# Patient Record
Sex: FEMALE | Race: WHITE | Employment: FULL TIME | URBAN - METROPOLITAN AREA
[De-identification: names, ages, dates, MRNs, and addresses within clinical notes are randomized per-mention and may not be internally consistent; named-entity substitution may affect disease eponyms.]

---

## 2018-12-11 ENCOUNTER — OFFICE VISIT (OUTPATIENT)
Dept: FAMILY MEDICINE CLINIC | Age: 43
End: 2018-12-11

## 2018-12-11 VITALS
DIASTOLIC BLOOD PRESSURE: 87 MMHG | TEMPERATURE: 97.7 F | RESPIRATION RATE: 18 BRPM | HEART RATE: 82 BPM | OXYGEN SATURATION: 97 % | HEIGHT: 62 IN | SYSTOLIC BLOOD PRESSURE: 117 MMHG | WEIGHT: 209 LBS | BODY MASS INDEX: 38.46 KG/M2

## 2018-12-11 DIAGNOSIS — K59.00 CONSTIPATION, UNSPECIFIED CONSTIPATION TYPE: ICD-10-CM

## 2018-12-11 DIAGNOSIS — R73.03 PREDIABETES: Primary | ICD-10-CM

## 2018-12-11 DIAGNOSIS — E55.9 VITAMIN D DEFICIENCY: ICD-10-CM

## 2018-12-11 DIAGNOSIS — M25.551 PAIN OF RIGHT HIP JOINT: ICD-10-CM

## 2018-12-11 DIAGNOSIS — Z87.74 S/P PATENT FORAMEN OVALE CLOSURE: ICD-10-CM

## 2018-12-11 DIAGNOSIS — R23.2 HOT FLASHES: ICD-10-CM

## 2018-12-11 DIAGNOSIS — M51.26 HERNIATED LUMBAR DISC WITHOUT MYELOPATHY: ICD-10-CM

## 2018-12-11 DIAGNOSIS — R91.1 PULMONARY NODULE: ICD-10-CM

## 2018-12-11 DIAGNOSIS — M25.50 ARTHRALGIA, UNSPECIFIED JOINT: ICD-10-CM

## 2018-12-11 DIAGNOSIS — M25.522 LEFT ELBOW PAIN: ICD-10-CM

## 2018-12-12 LAB
ABSOLUTE LYMPHOCYTE COUNT, 10803: 1.6 K/UL (ref 1–4.8)
ANA SCREEN, IFA: NEGATIVE
AVG GLU, 10930: 112 MG/DL (ref 91–123)
B BURGDOR IGG+IGM SER-ACNC: <0.9 INDEX
BASOPHILS # BLD: 0 K/UL (ref 0–0.2)
BASOPHILS NFR BLD: 1 % (ref 0–2)
C-REACTIVE PROTEIN, QT, 006627: 0.4 MG/DL (ref 0–0.5)
CCP ANTIBODY IGG,99138601: 0.7 U/ML
EOSINOPHIL # BLD: 0.2 K/UL (ref 0–0.5)
EOSINOPHIL NFR BLD: 3 % (ref 0–6)
ERYTHROCYTE [DISTWIDTH] IN BLOOD BY AUTOMATED COUNT: 13 % (ref 10–15.5)
FSH SERPL-ACNC: 9.1 MIU/ML
GRANULOCYTES,GRANS: 56 % (ref 40–75)
HBA1C MFR BLD HPLC: 5.5 % (ref 4.8–5.9)
HCT VFR BLD AUTO: 41 % (ref 35.1–46.5)
HGB BLD-MCNC: 12.7 G/DL (ref 11.7–15.5)
LH SERPL-ACNC: 20.1 MIU/ML
LYMPHOCYTES, LYMLT: 31 % (ref 20–45)
MCH RBC QN AUTO: 29 PG (ref 26–34)
MCHC RBC AUTO-ENTMCNC: 31 G/DL (ref 31–36)
MCV RBC AUTO: 94 FL (ref 80–95)
MONOCYTES # BLD: 0.4 K/UL (ref 0.1–1)
MONOCYTES NFR BLD: 8 % (ref 3–12)
NEUTROPHILS # BLD AUTO: 2.9 K/UL (ref 1.8–7.7)
PLATELET # BLD AUTO: 262 K/UL (ref 140–440)
PMV BLD AUTO: 12.3 FL (ref 9–13)
RBC # BLD AUTO: 4.35 M/UL (ref 3.8–5.2)
SED RATE (ESR): 20 MM/HR (ref 0–20)
TSH SERPL DL<=0.005 MIU/L-ACNC: 2.27 MCU/ML (ref 0.27–4.2)
WBC # BLD AUTO: 5.1 K/UL (ref 4–11)

## 2018-12-13 LAB — CALCITRIOL, 081092: 51.7 PG/ML

## 2018-12-27 ENCOUNTER — TELEPHONE (OUTPATIENT)
Dept: FAMILY MEDICINE CLINIC | Age: 43
End: 2018-12-27

## 2018-12-27 NOTE — TELEPHONE ENCOUNTER
Patient called in wanting to speak with a nurse or the doctor in regards to her blood work results.  Please assist.

## 2018-12-27 NOTE — TELEPHONE ENCOUNTER
Patient transferred to nurse, advised patient that Dr. Shania Gomez will call when results are abnormal. At this time, results are normal. Nurse advised patient that provider will be able to further explain if she is in going through menopause during her next visit, scheduled 01/11/2019. Per verbalized understanding, this encounter will be closed.

## 2019-01-11 ENCOUNTER — OFFICE VISIT (OUTPATIENT)
Dept: FAMILY MEDICINE CLINIC | Age: 44
End: 2019-01-11

## 2019-01-11 VITALS
OXYGEN SATURATION: 97 % | RESPIRATION RATE: 16 BRPM | BODY MASS INDEX: 38.28 KG/M2 | WEIGHT: 208 LBS | DIASTOLIC BLOOD PRESSURE: 85 MMHG | HEIGHT: 62 IN | TEMPERATURE: 98.2 F | SYSTOLIC BLOOD PRESSURE: 127 MMHG | HEART RATE: 69 BPM

## 2019-01-11 DIAGNOSIS — R91.1 LUNG NODULE: Primary | ICD-10-CM

## 2019-01-11 DIAGNOSIS — R91.1 LUNG NODULE: ICD-10-CM

## 2019-01-11 NOTE — PROGRESS NOTES
1. Have you been to the ER, urgent care clinic since your last visit? Hospitalized since your last visit? No    2. Have you seen or consulted any other health care providers outside of the [address] [place] [address] since your last visit? Include any pap smears or colon screening.  No

## 2019-01-13 NOTE — PROGRESS NOTES
Soila Meredith is a 41 y.o.  female and presents with     Chief Complaint   Patient presents with    Joint Pain    Anxiety    Fatigue    Constipation      Pt is here for lab results. Pt does have constipation and has appt with GI. She wonders if she is going through menopause. She does get night sweats. No past medical history on file. No past surgical history on file. No current outpatient medications on file. No current facility-administered medications for this visit. Health Maintenance   Topic Date Due    DTaP/Tdap/Td series (1 - Tdap) 03/18/1998    PAP AKA CERVICAL CYTOLOGY  03/18/1998    Influenza Age 5 to Adult  08/01/2018       There is no immunization history on file for this patient. No LMP recorded. Allergies and Intolerances: Allergies   Allergen Reactions    Morphine Anaphylaxis       Family History:   No family history on file. Social History:   She  reports that  has never smoked. she has never used smokeless tobacco.  She  reports that she does not drink alcohol.             Review of Systems:   General: negative for - chills, fatigue, fever, weight change  Psych: negative for - anxiety, depression, irritability or mood swings  ENT: negative for - headaches, hearing change, nasal congestion, oral lesions, sneezing or sore throat  Heme/ Lymph: negative for - bleeding problems, bruising, pallor or swollen lymph nodes  Endo: negative for - hot flashes, polydipsia/polyuria or temperature intolerance  Resp: negative for - cough, shortness of breath or wheezing  CV: negative for - chest pain, edema or palpitations  GI: negative for - abdominal pain, change in bowel habits, constipation, diarrhea or nausea/vomiting  : negative for - dysuria, hematuria, incontinence, pelvic pain or vulvar/vaginal symptoms  MSK: negative for - joint pain, joint swelling or muscle pain  Neuro: negative for - confusion, headaches, seizures or weakness  Derm: negative for - dry skin, hair changes, rash or skin lesion changes          Physical:   Vitals:   Vitals:    01/11/19 0929   BP: 127/85   Pulse: 69   Resp: 16   Temp: 98.2 °F (36.8 °C)   TempSrc: Oral   SpO2: 97%   Weight: 208 lb (94.3 kg)   Height: 5' 2\" (1.575 m)           Exam:   HEENT- atraumatic,normocephalic, awake, oriented, well nourished  Neck - supple,no enlarged lymph nodes, no JVD, no thyromegaly  Chest- CTA, no rhonchi, no crackles  Heart- rrr, no murmurs / gallop/rub  Abdomen- soft,BS+,NT, no hepatosplenomegaly  Ext - no c/c/edema   Neuro- no focal deficits. Power 5/5 all extremities  Skin - warm,dry, no obvious rashes. Review of Data:   LABS:   Lab Results   Component Value Date/Time    WBC 5.1 12/11/2018 11:04 AM    HGB 12.7 12/11/2018 11:04 AM    HCT 41.0 12/11/2018 11:04 AM    PLATELET 189 29/56/5233 11:04 AM     No results found for: NA, K, CL, CO2, GLU, BUN, CREA  No results found for: CHOL, CHOLX, CHLST, CHOLV, HDL, LDL, LDLC, DLDLP, TGLX, TRIGL, TRIGP  No results found for: GPT        Impression / Plan:        ICD-10-CM ICD-9-CM    1. Lung nodule R91.1 793.11 CT CHEST W WO CONT      ANGIOTENSIN CONVERTING ENZYME      REFERRAL TO PULMONARY DISEASE     Night , sweats ,fatigue, pulmonary nodule - will order CT chest.  Labs reviewed with pt. Explained to patient risk benefits of the medications. Advised patient to stop meds if having any side effects. Pt verbalized understanding of the instructions. I have discussed the diagnosis with the patient and the intended plan as seen in the above orders. The patient has received an after-visit summary and questions were answered concerning future plans. I have discussed medication side effects and warnings with the patient as well. I have reviewed the plan of care with the patient, accepted their input and they are in agreement with the treatment goals. Reviewed plan of care. Patient has provided input and agrees with goals.     Follow-up Disposition:  Return in about 5 weeks (around 2/15/2019).     Oli Luna MD

## 2019-01-17 LAB — ACE,ACE: 56 U/L

## 2019-01-23 ENCOUNTER — HOSPITAL ENCOUNTER (OUTPATIENT)
Dept: CT IMAGING | Age: 44
Discharge: HOME | End: 2019-01-23
Attending: INTERNAL MEDICINE
Payer: MEDICAID

## 2019-01-23 ENCOUNTER — TELEPHONE (OUTPATIENT)
Dept: FAMILY MEDICINE CLINIC | Age: 44
End: 2019-01-23

## 2019-01-23 DIAGNOSIS — R91.1 LUNG NODULE: ICD-10-CM

## 2019-01-23 PROCEDURE — 74011636320 HC RX REV CODE- 636/320: Performed by: INTERNAL MEDICINE

## 2019-01-23 PROCEDURE — 71260 CT THORAX DX C+: CPT

## 2019-01-23 RX ADMIN — IOPAMIDOL 45 ML: 612 INJECTION, SOLUTION INTRAVENOUS at 12:52

## 2019-01-23 NOTE — TELEPHONE ENCOUNTER
Patient called in reference to latest lab results. When patient was told that I could put in a message for the nurse to give her a call back. Patient states that she has a problem getting phone calls back and that she hopes that someone will call her back today.  Please assist.

## 2019-01-23 NOTE — TELEPHONE ENCOUNTER
Nurse called patient, two patient identifiers used and confirmed by patient. Advised patient that lab is WNL and if labs are within normal range, Dr. Clarisa Young will not call, if a lab is out of range, she will receive a call along with plan of care. Pt downloaded [employer] as well, pt stated she looked at the lab was not sure if it was normal or abnormal. Pt is also scheduled for CT chest today 01/23/2019. Pt verbalized understanding.

## 2019-01-28 ENCOUNTER — OFFICE VISIT (OUTPATIENT)
Dept: PULMONOLOGY | Facility: CLINIC | Age: 44
End: 2019-01-28

## 2019-01-28 VITALS
TEMPERATURE: 98.6 F | SYSTOLIC BLOOD PRESSURE: 110 MMHG | HEART RATE: 81 BPM | OXYGEN SATURATION: 97 % | WEIGHT: 202 LBS | RESPIRATION RATE: 16 BRPM | HEIGHT: 62 IN | BODY MASS INDEX: 37.17 KG/M2 | DIASTOLIC BLOOD PRESSURE: 80 MMHG

## 2019-01-28 DIAGNOSIS — R91.1 LUNG NODULE: Primary | ICD-10-CM

## 2019-01-28 RX ORDER — DULOXETIN HYDROCHLORIDE 20 MG/1
20 CAPSULE, DELAYED RELEASE ORAL DAILY
COMMUNITY
End: 2021-06-29
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
## 2019-02-14 ENCOUNTER — OFFICE VISIT (OUTPATIENT)
Dept: FAMILY MEDICINE CLINIC | Age: 44
End: 2019-02-14

## 2019-02-14 VITALS
DIASTOLIC BLOOD PRESSURE: 80 MMHG | HEART RATE: 97 BPM | TEMPERATURE: 98.4 F | HEIGHT: 62 IN | BODY MASS INDEX: 38.83 KG/M2 | SYSTOLIC BLOOD PRESSURE: 120 MMHG | WEIGHT: 211 LBS | OXYGEN SATURATION: 98 % | RESPIRATION RATE: 16 BRPM

## 2019-02-14 DIAGNOSIS — Z12.39 BREAST CANCER SCREENING: Primary | ICD-10-CM

## 2019-02-14 NOTE — PROGRESS NOTES
1. Have you been to the ER, urgent care clinic since your last visit? Hospitalized since your last visit? No    2. Have you seen or consulted any other health care providers outside of the [address] [place] [address] since your last visit? Include any pap smears or colon screening.  No

## 2019-02-15 NOTE — PROGRESS NOTES
Bennie Kahn is a 41 y.o.  female and presents with     Chief Complaint   Patient presents with    Anxiety    Constipation    Fatigue    Sexual Problem       Pt saw GI for constipation. Pt was not too happy with the GI physician. Pt saw Pulmonary for lung nodule. She has follow up CT scan scheduled in 1 year. Pt feels fatigued and thinks since she turned 42 some hormones are switched off and has loss of libido. No past medical history on file. No past surgical history on file. Current Outpatient Medications   Medication Sig    DULoxetine (CYMBALTA) 20 mg capsule Take 20 mg by mouth daily. No current facility-administered medications for this visit. Health Maintenance   Topic Date Due    DTaP/Tdap/Td series (1 - Tdap) 03/18/1998    PAP AKA CERVICAL CYTOLOGY  03/18/1998    Influenza Age 5 to Adult  08/01/2018       There is no immunization history on file for this patient. Patient's last menstrual period was 02/07/2019. Allergies and Intolerances: Allergies   Allergen Reactions    Morphine Anaphylaxis       Family History:   No family history on file. Social History:   She  reports that  has never smoked. she has never used smokeless tobacco.  She  reports that she does not drink alcohol.             Review of Systems:   General: negative for - chills, fatigue, fever, weight change  Psych: negative for - anxiety, depression, irritability or mood swings  ENT: negative for - headaches, hearing change, nasal congestion, oral lesions, sneezing or sore throat  Heme/ Lymph: negative for - bleeding problems, bruising, pallor or swollen lymph nodes  Endo: negative for - hot flashes, polydipsia/polyuria or temperature intolerance  Resp: negative for - cough, shortness of breath or wheezing  CV: negative for - chest pain, edema or palpitations  GI: negative for - abdominal pain, change in bowel habits, constipation, diarrhea or nausea/vomiting  : negative for - dysuria, hematuria, incontinence, pelvic pain or vulvar/vaginal symptoms  MSK: negative for - joint pain, joint swelling or muscle pain  Neuro: negative for - confusion, headaches, seizures or weakness  Derm: negative for - dry skin, hair changes, rash or skin lesion changes          Physical:   Vitals:   Vitals:    02/14/19 1317   BP: 120/80   Pulse: 97   Resp: 16   Temp: 98.4 °F (36.9 °C)   TempSrc: Oral   SpO2: 98%   Weight: 211 lb (95.7 kg)   Height: 5' 2\" (1.575 m)           Exam:   HEENT- atraumatic,normocephalic, awake, oriented, well nourished  Neck - supple,no enlarged lymph nodes, no JVD, no thyromegaly  Chest- CTA, no rhonchi, no crackles  Heart- rrr, no murmurs / gallop/rub  Abdomen- soft,BS+,NT, no hepatosplenomegaly  Ext - no c/c/edema   Neuro- no focal deficits. Power 5/5 all extremities  Skin - warm,dry, no obvious rashes. Review of Data:   LABS:   Lab Results   Component Value Date/Time    WBC 5.1 12/11/2018 11:04 AM    HGB 12.7 12/11/2018 11:04 AM    HCT 41.0 12/11/2018 11:04 AM    PLATELET 073 32/87/2124 11:04 AM     No results found for: NA, K, CL, CO2, GLU, BUN, CREA  No results found for: CHOL, CHOLX, CHLST, CHOLV, HDL, LDL, LDLC, DLDLP, TGLX, TRIGL, TRIGP  No results found for: GPT        Impression / Plan:        ICD-10-CM ICD-9-CM    1. Breast cancer screening Z12.31 V76.10 [employer] MAMMO BI SCREENING INCL CAD     Informed pt that there is no obvious cause medicaly that can explain her fatgue ,joint pains and loss of libido. Work up has been neg, Positive mind set would help alleviated symptoms. Explained to patient risk benefits of the medications. Advised patient to stop meds if having any side effects. Pt verbalized understanding of the instructions. I have discussed the diagnosis with the patient and the intended plan as seen in the above orders. The patient has received an after-visit summary and questions were answered concerning future plans.   I have discussed medication side effects and warnings with the patient as well. I have reviewed the plan of care with the patient, accepted their input and they are in agreement with the treatment goals. Reviewed plan of care. Patient has provided input and agrees with goals. Follow-up Disposition:  Return in about 3 months (around 5/14/2019).     Frederick Leone MD

## 2019-04-17 ENCOUNTER — HOSPITAL ENCOUNTER (OUTPATIENT)
Dept: MAMMOGRAPHY | Age: 44
Discharge: HOME | End: 2019-04-17
Attending: INTERNAL MEDICINE
Payer: MEDICAID

## 2019-04-17 DIAGNOSIS — Z12.39 BREAST CANCER SCREENING: ICD-10-CM

## 2019-04-17 PROCEDURE — 77063 BREAST TOMOSYNTHESIS BI: CPT

## 2020-01-09 ENCOUNTER — OFFICE VISIT (OUTPATIENT)
Dept: FAMILY MEDICINE CLINIC | Age: 45
End: 2020-01-09

## 2020-01-09 VITALS
BODY MASS INDEX: 40.3 KG/M2 | WEIGHT: 219 LBS | HEART RATE: 84 BPM | SYSTOLIC BLOOD PRESSURE: 134 MMHG | TEMPERATURE: 96.4 F | HEIGHT: 62 IN | DIASTOLIC BLOOD PRESSURE: 86 MMHG | RESPIRATION RATE: 18 BRPM | OXYGEN SATURATION: 98 %

## 2020-01-09 DIAGNOSIS — F43.10 PTSD (POST-TRAUMATIC STRESS DISORDER): ICD-10-CM

## 2020-01-09 DIAGNOSIS — E55.9 VITAMIN D DEFICIENCY: Primary | ICD-10-CM

## 2020-01-09 DIAGNOSIS — R73.03 PREDIABETES: ICD-10-CM

## 2020-01-09 DIAGNOSIS — R03.0 ELEVATED BLOOD PRESSURE READING: ICD-10-CM

## 2020-01-09 DIAGNOSIS — R42 EPISODIC LIGHTHEADEDNESS: ICD-10-CM

## 2020-01-09 DIAGNOSIS — R91.1 LUNG NODULE: ICD-10-CM

## 2020-01-09 PROBLEM — K59.01 SLOW TRANSIT CONSTIPATION: Status: ACTIVE | Noted: 2020-01-09

## 2020-01-09 RX ORDER — GABAPENTIN 100 MG/1
CAPSULE ORAL 3 TIMES DAILY
COMMUNITY
End: 2021-08-10

## 2020-01-09 RX ORDER — INSULIN PUMP SYRINGE, 3 ML
EACH MISCELLANEOUS
Qty: 1 KIT | Refills: 0 | Status: SHIPPED | OUTPATIENT
Start: 2020-01-09 | End: 2021-06-29

## 2020-01-09 RX ORDER — LANCETS
EACH MISCELLANEOUS
Qty: 100 EACH | Refills: 0 | Status: SHIPPED | OUTPATIENT
Start: 2020-01-09 | End: 2021-06-29

## 2020-01-09 RX ORDER — ACETAMINOPHEN 500 MG
TABLET ORAL
Qty: 1 KIT | Refills: 0 | Status: SHIPPED | OUTPATIENT
Start: 2020-01-09 | End: 2021-06-29

## 2020-01-09 RX ORDER — LUBIPROSTONE 8 UG/1
24 CAPSULE, GELATIN COATED ORAL 2 TIMES DAILY WITH MEALS
COMMUNITY
Start: 2019-12-10 | End: 2020-03-23 | Stop reason: DRUGHIGH

## 2020-01-09 RX ORDER — GABAPENTIN 100 MG/1
CAPSULE ORAL
COMMUNITY
Start: 2019-12-07 | End: 2020-01-09 | Stop reason: SDUPTHER

## 2020-01-09 RX ORDER — LUBIPROSTONE 24 UG/1
CAPSULE, GELATIN COATED ORAL
COMMUNITY
Start: 2020-01-03 | End: 2020-01-09 | Stop reason: SDUPTHER

## 2020-01-09 NOTE — PROGRESS NOTES
History of Present Illness  Michael Gibbs is a 45 y.o. female who presents today for management of    Chief Complaint   Patient presents with    Dizziness    Blood Pressure Check     neurologist told patient to talk to her PCP about bp as well as blood sugar       Patient is here to establish care. Previous PCP: Dr. Louis Davis    Patient complains of intermittent dizziness. Onset was few years ago. Episodes occurs randomly. Episodes last 5-10 minutes. Only occurs when standing or in upright position. It is associated with nausea. She was prescribed gabapentin by her neurologist for anxiety. She denies any headaches. Patient reports of having a positive tilt table test in 15 years ago. Past Medical History  Past Medical History:   Diagnosis Date    Anxiety     Lung nodule     Prediabetes         Surgical History  Past Surgical History:   Procedure Laterality Date    HX OTHER SURGICAL      Closure of patent foramen ovale        Current Medications  Current Outpatient Medications   Medication Sig    gabapentin (NEURONTIN) 100 mg capsule Take  by mouth three (3) times daily.  Blood-Glucose Meter monitoring kit Use as needed    Blood Pressure Monitor (BLOOD PRESSURE KIT) kit Use as needed    glucose blood VI test strips (BLOOD GLUCOSE TEST) strip Use once daily as needed    lancets misc Use once daily as needed    AMITIZA 8 mcg capsule     DULoxetine (CYMBALTA) 20 mg capsule Take 20 mg by mouth daily. No current facility-administered medications for this visit. Allergies/Drug Reactions  Allergies   Allergen Reactions    Morphine Anaphylaxis        Family History  History reviewed. No pertinent family history.      Social History  Social History     Socioeconomic History    Marital status: LEGALLY      Spouse name: Not on file    Number of children: Not on file    Years of education: Not on file    Highest education level: Not on file   Occupational History    Not on file Social Needs    Financial resource strain: Not on file    Food insecurity:     Worry: Not on file     Inability: Not on file    Transportation needs:     Medical: Not on file     Non-medical: Not on file   Tobacco Use    Smoking status: Never Smoker    Smokeless tobacco: Never Used   Substance and Sexual Activity    Alcohol use: No     Frequency: Never    Drug use: No    Sexual activity: Not Currently   Lifestyle    Physical activity:     Days per week: Not on file     Minutes per session: Not on file    Stress: Not on file   Relationships    Social connections:     Talks on phone: Not on file     Gets together: Not on file     Attends [employer] service: Not on file     Active member of club or organization: Not on file     Attends meetings of clubs or organizations: Not on file     Relationship status: Not on file    Intimate partner violence:     Fear of current or ex partner: Not on file     Emotionally abused: Not on file     Physically abused: Not on file     Forced sexual activity: Not on file   Other Topics Concern    Not on file   Social History Narrative    Not on file       Health Maintenance   Topic Date Due    DTaP/Tdap/Td series (1 - Tdap) 03/18/1988    PAP AKA CERVICAL CYTOLOGY  03/18/1998    Influenza Age 5 to Adult  08/01/2019    Pneumococcal 0-64 years  Aged Out       There is no immunization history on file for this patient. Review of Systems  Review of Systems   Constitutional: Negative. HENT: Negative. Respiratory: Negative. Cardiovascular: Positive for palpitations. Gastrointestinal: Positive for constipation. Genitourinary: Negative. Musculoskeletal: Negative. Skin: Negative. Neurological: Positive for dizziness. Psychiatric/Behavioral: The patient is nervous/anxious.          Physical Exam  Vital signs:   Vitals:    01/09/20 1432 01/09/20 1442   BP: (!) 134/91 134/86   Pulse: 84    Resp: 18    Temp: 96.4 °F (35.8 °C)    SpO2: 98%    Weight: 219 lb (99.3 kg)    Height: 5' 2\" (1.575 m)        General: alert, oriented, not in distress  Head: scalp normal, atraumatic  Eyes: pupils are equal and reactive, full and intact EOM's  Ears: patent ear canal, intact tympanic membrane  Nose: normal turbinates, no congestion or discharge  Lips/Mouth: moist lips and buccal mucosa, non-enlarged tonsils, pink throat  Neck: supple, no JVD, no lymphadenopathy, non-palpable thyroid  Chest/Lungs: clear breath sounds, no wheezing or crackles  Heart: normal rate, regular rhythm, no murmur  Extremities: no focal deformities, no edema  Skin: no active skin lesions      Laboratory/Tests:  CBC WITH DIFFERENTIAL AUTO (01/02/2020 5:36 PM EST)  CBC WITH DIFFERENTIAL AUTO (01/02/2020 5:36 PM EST)   Component Value Ref Range Performed At Pathologist Signature   WBC x 10*3 7.1 4.0 - 11.0 K/uL [facility]     RBC x 10^6 4. 15 3.80 - 5.20 M/uL [facility]     HGB 12.3 11.7 - 15.5 g/dL [facility]     HCT 36.4 35.1 - 46.5 % [facility]     MCV 88 81 - 99 fL [facility]     MCH 30 26 - 34 [facility]     MCHC 34 31 - 36 g/dL [facility]     RDW 12.0 10.0 - 15.5 % [facility]     Platelet 770 126 - 711 K/uL [facility]     MPV 11.4 9.0 - 13.0 fL [facility]     Segmented Neutrophils 61 40 - 75 % [facility]     Lymphocytes 26 20 - 45 % [facility]     Monocytes 11 3 - 12 % [facility]     Eosinophil 1 0 - 6 % [facility]     Basophils 1 0 - 2 % [facility]     Absolute Neutrophils 4.3 1.8 - 7.7 K/uL [facility]     Absolute Lymphocytes 1.8 1.0 - 4.8 K/uL [facility]     Absolute Monocyte Count 0.8 0.1 - 1.0 K/uL [facility]     Absolute Eosinophil 0.1 0.0 - 0.5 K/uL [facility]     Absolute Basophil Count 0.1 0.0 - 0.2 K/uL [facility]     Vitamin B12 and Folate (01/02/2020 5:36 PM EST)  Vitamin B12 and Folate (01/02/2020 5:36 PM EST)   Component Value Ref Range Performed At Pathologist [facility]   Vitamin B12 371 211 - 911 pg/mL [facility]     Folate 11.96 >=3.10 ng/mL [facility]       Vitamin D 25 Hydroxy (01/02/2020 5:36 PM EST)  Vitamin D 25 Hydroxy (01/02/2020 5:36 PM EST)   Component Value Ref Range Performed At Pathologist [facility]   Vitamin D, 25 Hydroxy 21.7 (L) 32.0 - 100.0 ng/mL [facility]       Basic Metabolic Panel (92/11/1410 5:36 PM EST)  Basic Metabolic Panel (59/49/2869 5:36 PM EST)   Component Value Ref Range Performed At Pathologist Signature   Potassium 3.8 3.5 - 5.5 mmol/L [facility]     Sodium 139 133 - 145 mmol/L [facility]     Chloride 100 98 - 110 mmol/L [facility]     Glucose 117 (H) 70 - 99 mg/dL [facility]     Calcium 9.0 8.4 - 10.5 mg/dL [facility]     BUN 16 6 - 22 mg/dL [facility]     Creatinine 0.8 0.5 - 1.2 mg/dL [facility]     CO2 27 20 - 32 mmol/L [facility]     eGFR  >60.0 >60.0 [facility]     eGFR Non African American >60.0 >60.0 [facility]     Anion Gap 12.0 mmol/L [facility]       T4 Free (01/02/2020 5:36 PM EST)  T4 Free (01/02/2020 5:36 PM EST)   Component Value Ref Range Performed At Pathologist [facility]   T4 FREE 1.1 0.9 - 1.8 ng/dL [facility]       T4 Free (01/02/2020 5:36 PM EST)   Specimen   Blood - Blood (substance)     T4 Free (01/02/2020 5:36 PM EST)   Performing Organization Address City/State/Zipcode Phone Number   [address]  [address] [place] [address], [address] [place] [address], [address] [place] [address] [phone]     Back to top of Lab Results       TSH (01/02/2020 5:36 PM EST)  TSH (01/02/2020 5:36 PM EST)   Component Value Ref Range Performed At Pathologist [facility]   TSH 1.32 0.27 - 4.20 mcU/mL [facility]         Component      Latest Ref Rng & Units 12/11/2018 12/11/2018 12/11/2018          11:04 AM 11:04 AM 11:04 AM   WBC      4.0 - 11.0 K/uL      RBC      3.80 - 5.20 M/uL      HGB      11.7 - 15.5 g/dL      HCT      35.1 - 46.5 %      MCV      80 - 95 fL      MCH      26 - 34 pg      MCHC      31 - 36 g/dL      RDW      10.0 - 15.5 %      PLATELET      891 - 996 K/uL      MPV      9.0 - 13.0 fL      NEUTROPHILS      40 - 75 %      Lymphocytes      20 - 45 %      MONOCYTES      3 - 12 %      EOSINOPHILS      0 - 6 %      BASOPHILS      0 - 2 %      ABS. NEUTROPHILS      1.8 - 7.7 K/uL      ABSOLUTE LYMPHOCYTE COUNT      1.0 - 4.8 K/uL      ABS. MONOCYTES      0.1 - 1.0 K/uL      ABS. EOSINOPHILS      0.0 - 0.5 K/uL      ABS. BASOPHILS      0.0 - 0.2 K/uL      Hemoglobin A1c, (calculated)      4.8 - 5.9 %  5.5    AVG GLU      91 - 123 mg/dL  112    Calcitriol (Vit D 1, 25 di-OH)      19.9 - 79 pg/mL 51.7     TSH      0.27 - 4.20 mcU/mL   2.27     Component      Latest Ref Rng & Units 12/11/2018          11:04 AM   WBC      4.0 - 11.0 K/uL 5.1   RBC      3.80 - 5.20 M/uL 4.35   HGB      11.7 - 15.5 g/dL 12.7   HCT      35.1 - 46.5 % 41.0   MCV      80 - 95 fL 94   MCH      26 - 34 pg 29   MCHC      31 - 36 g/dL 31   RDW      10.0 - 15.5 % 13.0   PLATELET      177 - 550 K/uL 262   MPV      9.0 - 13.0 fL 12.3   NEUTROPHILS      40 - 75 % 56   Lymphocytes      20 - 45 % 31   MONOCYTES      3 - 12 % 8   EOSINOPHILS      0 - 6 % 3   BASOPHILS      0 - 2 % 1   ABS. NEUTROPHILS      1.8 - 7.7 K/uL 2.9   ABSOLUTE LYMPHOCYTE COUNT      1.0 - 4.8 K/uL 1.6   ABS. MONOCYTES      0.1 - 1.0 K/uL 0.4   ABS. EOSINOPHILS      0.0 - 0.5 K/uL 0.2   ABS.  BASOPHILS      0.0 - 0.2 K/uL 0.0   Hemoglobin A1c, (calculated)      4.8 - 5.9 %    AVG GLU      91 - 123 mg/dL    Calcitriol (Vit D 1, 25 di-OH)      19.9 - 79 pg/mL    TSH      0.27 - 4.20 mcU/mL      Component      Latest Ref Rng & Units 12/11/2018 12/11/2018          11:04 AM 11:04 AM   NISA SCREEN, IFA      Negative  Negative   CCP Ab, IgG      <=2.9 U/mL 0.7      CT CHEST 01/23/2019  COMPARISON: None.     INDICATION: History of lung nodules.     TECHNIQUE: Axial was performed through the chest with contrast.  Coronal and  sagittal reformations were generated.      One or more dose reduction techniques were used on this CT: automated exposure  control, adjustment of the mAs and/or kVp according to patient's size, and  iterative reconstruction techniques. The specific techniques utilized on this CT  exam have been documented in the patient's electronic medical record.     ============     LUNGS: 5 mm solid intrafissural pulmonary nodule at the minor fissure (series 3,  image 42). 3 mm posterior right upper lobe subpleural solid nodule (series 3,  image 29). No other suspicious pulmonary nodules or masses. No acute infiltrate.     PLEURA: Normal, with no effusion or pneumothorax.     AIRWAY: Unremarkable.     MEDIASTINUM: Normal heart size. No pericardial effusion. Great vessels are  unremarkable. Possibly atrial closure device noted in the interatrial septum.     LYMPH NODES: No mediastinal, hilar or axillary lymphadenopathy.     UPPER ABDOMEN: Cholelithiasis without acute -cholecystitis.     OTHER: No acute osseous abnormality. 7 mm sclerotic focus in the right T5  vertebral body.     ============     IMPRESSION  IMPRESSION:     No suspicious pulmonary nodules or masses. Right interfissural and subpleural nodules likely lymph nodes. No further follow-up is required.       Assessment/Plan:      1. Episodic lightheadedness  - ? POTS  - advised to liberalize salt intake  - start home blood pressure and blood sugar monitoring  - Blood-Glucose Meter monitoring kit; Use as needed  Dispense: 1 Kit; Refill: 0  - Blood Pressure Monitor (BLOOD PRESSURE KIT) kit; Use as needed  Dispense: 1 Kit; Refill: 0  - glucose blood VI test strips (BLOOD GLUCOSE TEST) strip; Use once daily as needed  Dispense: 100 Strip;  Refill: 0  - lancets misc; Use once daily as needed  Dispense: 100 Each; Refill: 0    2. Elevated blood pressure reading  - start home blood pressure monitoring  - LIPID PANEL; Future  - METABOLIC PANEL, COMPREHENSIVE; Future  - URINALYSIS W/ RFLX MICROSCOPIC; Future  - Blood Pressure Monitor (BLOOD PRESSURE KIT) kit; Use as needed  Dispense: 1 Kit; Refill: 0  - METABOLIC PANEL, COMPREHENSIVE  - LIPID PANEL    3. PTSD (post-traumatic stress disorder)  - URINALYSIS W/ RFLX MICROSCOPIC; Future    4. Prediabetes  - HEMOGLOBIN A1C WITH EAG; Future  - LIPID PANEL; Future  - METABOLIC PANEL, COMPREHENSIVE; Future  - URINALYSIS W/ RFLX MICROSCOPIC; Future  - Blood-Glucose Meter monitoring kit; Use as needed  Dispense: 1 Kit; Refill: 0  - glucose blood VI test strips (BLOOD GLUCOSE TEST) strip; Use once daily as needed  Dispense: 100 Strip; Refill: 0  - lancets misc; Use once daily as needed  Dispense: 100 Each; Refill: 0  - METABOLIC PANEL, COMPREHENSIVE  - LIPID PANEL  - HEMOGLOBIN A1C WITH EAG    5. Vitamin D deficiency    6. Lung nodule  - CT CHEST WO CONT; Future    Follow-up and Dispositions    · Return in about 4 weeks (around 2/6/2020) for follow-up. I have discussed the diagnosis with the patient and the intended plan as seen in the above orders. The patient has received an after-visit summary and questions were answered concerning future plans. I have discussed medication side effects and warnings with the patient as well. I have reviewed the plan of care with the patient, accepted their input and they are in agreement with the treatment goals.        Nighat Pedersen MD  January 9, 2020

## 2020-01-09 NOTE — PROGRESS NOTES
Valorie Calvin is a 45 y.o. female who presents today for      Chief Complaint   Patient presents with    Dizziness    Blood Pressure Check     neurologist told patient to talk to her PCP about bp as well as blood sugar       1. Have you been to the ER, urgent care clinic since your last visit? Hospitalized since your last visit? Yes, velocity for bronchitis (1/3/2020) and URI (dec. 2019)    2. Have you seen or consulted any other health care providers outside of the [address] [place] [address] since your last visit? Include any pap smears or colon screening. GI Dr. Samy Sanchez last saw her Monday 1/6/2020     Health Maintenance reviewed.     Health Maintenance Due   Topic Date Due    DTaP/Tdap/Td series (1 - Tdap) 03/18/1988    PAP AKA CERVICAL CYTOLOGY  03/18/1998    Influenza Age 9 to Adult  08/01/2019

## 2020-01-10 LAB
A-G RATIO,AGRAT: 1.4 RATIO (ref 1.1–2.6)
ALBUMIN SERPL-MCNC: 4.3 G/DL (ref 3.5–5)
ALP SERPL-CCNC: 77 U/L (ref 25–115)
ALT SERPL-CCNC: 10 U/L (ref 5–40)
ANION GAP SERPL CALC-SCNC: 10 MMOL/L
AST SERPL W P-5'-P-CCNC: 11 U/L (ref 10–37)
AVG GLU, 10930: 120 MG/DL (ref 91–123)
BILIRUB SERPL-MCNC: 0.2 MG/DL (ref 0.2–1.2)
BILIRUB UR QL: NEGATIVE
BUN SERPL-MCNC: 12 MG/DL (ref 6–22)
CALCIUM SERPL-MCNC: 9.7 MG/DL (ref 8.4–10.5)
CHLORIDE SERPL-SCNC: 100 MMOL/L (ref 98–110)
CHOLEST SERPL-MCNC: 192 MG/DL (ref 110–200)
CO2 SERPL-SCNC: 27 MMOL/L (ref 20–32)
CREAT SERPL-MCNC: 0.7 MG/DL (ref 0.5–1.2)
EPITHELIAL,EPSU: NORMAL /HPF (ref 0–2)
GFRAA, 66117: >60
GFRNA, 66118: >60
GLOBULIN,GLOB: 3.1 G/DL (ref 2–4)
GLUCOSE SERPL-MCNC: 103 MG/DL (ref 70–99)
GLUCOSE UR QL: NEGATIVE MG/DL
HBA1C MFR BLD HPLC: 5.8 % (ref 4.8–5.6)
HDLC SERPL-MCNC: 3.4 MG/DL (ref 0–5)
HDLC SERPL-MCNC: 57 MG/DL
HGB UR QL STRIP: NEGATIVE
KETONES UR QL STRIP.AUTO: NEGATIVE MG/DL
LDL/HDL RATIO,LDHD: 1.9
LDLC SERPL CALC-MCNC: 110 MG/DL (ref 50–99)
LEUKOCYTE ESTERASE: NEGATIVE
NITRITE UR QL STRIP.AUTO: NEGATIVE
NON-HDL CHOLESTEROL, 011976: 135 MG/DL
PH UR STRIP: 6 PH (ref 5–8)
POTASSIUM SERPL-SCNC: 4.5 MMOL/L (ref 3.5–5.5)
PROT SERPL-MCNC: 7.4 G/DL (ref 6.4–8.3)
PROT UR QL STRIP: NEGATIVE MG/DL
RBC #/AREA URNS HPF: NORMAL /HPF
SODIUM SERPL-SCNC: 137 MMOL/L (ref 133–145)
SP GR UR: 1.01 (ref 1–1.03)
TRIGL SERPL-MCNC: 127 MG/DL (ref 40–149)
UROBILINOGEN UR STRIP-MCNC: <2 MG/DL
VLDLC SERPL CALC-MCNC: 25 MG/DL (ref 8–30)
WBC URNS QL MICRO: NORMAL /HPF (ref 0–2)

## 2020-01-15 ENCOUNTER — PATIENT MESSAGE (OUTPATIENT)
Dept: FAMILY MEDICINE CLINIC | Age: 45
End: 2020-01-15

## 2020-01-16 NOTE — TELEPHONE ENCOUNTER
From: Betzaida Lane  To: Xenia Avery MD  Sent: 1/15/2020 7:58 AM EST  Subject: Non-Urgent Medical Question    I have a question about METABOLIC PANEL, COMPREHENSIVE resulted on 1/10/20 at 6:20 PM.Hi i see some things are high could uou please let me know .

## 2020-01-22 NOTE — TELEPHONE ENCOUNTER
BP monitoring kit faxed to [address] [place] [address]. [phone]. This encounter will be closed.

## 2020-03-23 ENCOUNTER — OFFICE VISIT (OUTPATIENT)
Dept: FAMILY MEDICINE CLINIC | Age: 45
End: 2020-03-23

## 2020-03-23 VITALS
RESPIRATION RATE: 18 BRPM | HEART RATE: 93 BPM | TEMPERATURE: 97 F | SYSTOLIC BLOOD PRESSURE: 125 MMHG | BODY MASS INDEX: 39.75 KG/M2 | WEIGHT: 216 LBS | DIASTOLIC BLOOD PRESSURE: 78 MMHG | OXYGEN SATURATION: 98 % | HEIGHT: 62 IN

## 2020-03-23 DIAGNOSIS — R42 VERTIGO: ICD-10-CM

## 2020-03-23 DIAGNOSIS — R00.2 PALPITATIONS: Primary | ICD-10-CM

## 2020-03-23 DIAGNOSIS — R73.03 PREDIABETES: ICD-10-CM

## 2020-03-23 DIAGNOSIS — R91.1 LUNG NODULE: ICD-10-CM

## 2020-03-23 RX ORDER — OMEPRAZOLE 20 MG/1
CAPSULE, DELAYED RELEASE ORAL
COMMUNITY
Start: 2020-03-14 | End: 2021-06-29

## 2020-03-23 RX ORDER — LUBIPROSTONE 24 UG/1
CAPSULE, GELATIN COATED ORAL
COMMUNITY
Start: 2020-03-16 | End: 2021-06-29

## 2020-03-23 RX ORDER — MECLIZINE HYDROCHLORIDE 25 MG/1
25 TABLET ORAL
Qty: 30 TAB | Refills: 0 | Status: SHIPPED | OUTPATIENT
Start: 2020-03-23 | End: 2021-06-29

## 2020-03-23 NOTE — PROGRESS NOTES
Gabe Nancyforest presents today for htn   - Is someone accompanying this pt? no  - Is the patient using any durable medical equipment during office visit? no    Coordination of Care:  1. Have you been to the ER, urgent care clinic since your last visit? Hospitalized since your last visit? no    2. Have you seen or consulted any other health care providers outside of the [address] [place] [address] since your last visit? Include any pap smears or colon screening.  No

## 2020-03-23 NOTE — PROGRESS NOTES
History of Present Illness  Imelda Correa is a 39 y.o. female who presents today for management of    Chief Complaint   Patient presents with    Hypertension    Dizziness       Patient complains of feeling dizzy upon waking up this morning, described as spinning. Her blood pressure was 147/90 and HR 78. Patient reports of having on and off palpitations for many years. It is associated with dizziness. She has about 2-3 episodes per week. No history of syncope. Patient reports of a positive tilt-table test in 2005 in [place]. She reports of having a normal cardiac Holter monitor in 2/2018. Problem List  Patient Active Problem List    Diagnosis Date Noted    Slow transit constipation 01/09/2020    Episodic lightheadedness 01/09/2020    Vitamin D deficiency 01/09/2020    Prediabetes     Lung nodule     Anxiety        Past Medical History  Past Medical History:   Diagnosis Date    Anxiety     Lung nodule     Prediabetes         Surgical History  Past Surgical History:   Procedure Laterality Date    HX OTHER SURGICAL      Closure of patent foramen ovale        Current Medications  Current Outpatient Medications   Medication Sig    meclizine (ANTIVERT) 25 mg tablet Take 1 Tab by mouth three (3) times daily as needed for Dizziness.  gabapentin (NEURONTIN) 100 mg capsule Take  by mouth three (3) times daily.  Blood-Glucose Meter monitoring kit Use as needed    Blood Pressure Monitor (BLOOD PRESSURE KIT) kit Use as needed    glucose blood VI test strips (BLOOD GLUCOSE TEST) strip Use once daily as needed    lancets misc Use once daily as needed    Amitiza 24 mcg capsule TK ONE C PO  BID WITH FOOD    omeprazole (PRILOSEC) 20 mg capsule TK 1 C PO D 30 MINUTES TO 1 H BEFORE A MEAL    DULoxetine (CYMBALTA) 20 mg capsule Take 20 mg by mouth daily. No current facility-administered medications for this visit.         Allergies/Drug Reactions  Allergies   Allergen Reactions    Morphine Anaphylaxis Family History  Family History   Problem Relation Age of Onset   Michael Estrella Stroke Mother     Hypertension Mother     Coronary Artery Disease Paternal Grandmother     Heart Attack Paternal Grandfather 39        Social History  Social History     Socioeconomic History    Marital status: LEGALLY      Spouse name: Not on file    Number of children: Not on file    Years of education: Not on file    Highest education level: Not on file   Occupational History    Not on file   Social Needs    Financial resource strain: Not on file    Food insecurity     Worry: Not on file     Inability: Not on file    Transportation needs     Medical: Not on file     Non-medical: Not on file   Tobacco Use    Smoking status: Never Smoker    Smokeless tobacco: Never Used   Substance and Sexual Activity    Alcohol use: No     Frequency: Never    Drug use: No    Sexual activity: Not Currently   Lifestyle    Physical activity     Days per week: Not on file     Minutes per session: Not on file    Stress: Not on file   Relationships    Social connections     Talks on phone: Not on file     Gets together: Not on file     Attends [employer] service: Not on file     Active member of club or organization: Not on file     Attends meetings of clubs or organizations: Not on file     Relationship status: Not on file    Intimate partner violence     Fear of current or ex partner: Not on file     Emotionally abused: Not on file     Physically abused: Not on file     Forced sexual activity: Not on file   Other Topics Concern    Not on file   Social History Narrative    Not on file       Review of Systems  Review of Systems   Constitutional: Negative. HENT: Negative. Respiratory: Negative. Cardiovascular: Negative. Gastrointestinal: Negative. Musculoskeletal: Negative. Neurological: Positive for dizziness.        Physical Exam  Vital signs:   Vitals:    03/23/20 1249   BP: 125/78   Pulse: 93   Resp: 18   Temp: 97 °F (36.1 °C)   TempSrc: Oral   SpO2: 98%   Weight: 216 lb (98 kg)   Height: 5' 2\" (1.575 m)       General: alert, oriented, not in distress  Eyes: clear conjunctivae, anicteric sclerae, full and equal ROMs  Chest/Lungs: clear breath sounds, no wheezing or crackles  Heart: normal rate, regular rhythm, no murmur  Extremities: no focal deformities, no edema  Neuro: AAOx3, CN's grossly intact  Skin: no visible abnormalities    Laboratory/Tests:  Component      Latest Ref Rng & Units 1/9/2020 1/9/2020 1/9/2020 1/9/2020           4:26 PM  4:26 PM  4:26 PM  4:26 PM   Glucose      70 - 99 mg/dL  103 (H)     BUN      6 - 22 mg/dL  12     Creatinine      0.5 - 1.2 mg/dL  0.7     Sodium      133 - 145 mmol/L  137     Potassium      3.5 - 5.5 mmol/L  4.5     Chloride      98 - 110 mmol/L  100     CO2      20 - 32 mmol/L  27     AST      10 - 37 U/L  11     ALT (SGPT)      5 - 40 U/L  10     Alk.  phosphatase      25 - 115 U/L  77     Bilirubin, total      0.2 - 1.2 mg/dL  0.2     Calcium      8.4 - 10.5 mg/dL  9.7     Protein, total      6.4 - 8.3 g/dL  7.4     Albumin      3.5 - 5.0 g/dL  4.3     A-G Ratio      1.1 - 2.6 ratio  1.4     Globulin      2.0 - 4.0 g/dL  3.1     Anion gap      mmol/L  10.0     GFRAA      >60.0  >60.0     GFRNA      >60.0  >60.0     Specific Gravity      1.005 - 1.03    1.009   pH (UA)      5.0 - 8.0 pH    6.0   Protein      Negative, mg/dL    Negative   Glucose      Negative mg/dL    Negative   Ketone      Negative, mg/dL    Negative   Bilirubin      Negative    Negative   Blood      Negative    Negative   Nitrites      Negative    Negative   Leukocyte Esterase      Negative    Negative   Urobilinogen      <2.0 mg/dL    <2.0   WBC      0 - 2 /hpf    0-2   RBC      Negative, /hpf    0-2   Epithelial cells      0 - 2 /hpf    0-2   Triglyceride      40 - 149 mg/dL 127      HDL Cholesterol      >=40 mg/dL 57      Cholesterol, total      110 - 200 mg/dL 192      CHOLESTEROL/HDL      0.0 - 5.0 3.4 Non-HDL Cholesterol      <130 mg/dL 135 (H)      LDL, calculated      50 - 99 mg/dL 110 (H)      VLDL, calculated      8 - 30 mg/dL 25      LDL/HDL Ratio       1.9      Hemoglobin A1c, (calculated)      4.8 - 5.6 %   5.8 (H)    AVG GLU      91 - 123 mg/dL   120        Assessment/Plan:    1. Palpitations  - REFERRAL TO CARDIOLOGY    2. Vertigo  - meclizine (ANTIVERT) 25 mg tablet; Take 1 Tab by mouth three (3) times daily as needed for Dizziness. Dispense: 30 Tab; Refill: 0    3. Prediabetes  - lifestyle modification    4. Lung nodule  - Patient will call imaging to schedule CT scan    5. Episodic lightheadedness  ? POTS  - advised to liberalize salt intake  - continue home blood pressure monitoring    Follow-up and Dispositions    · Return in about 4 months (around 7/23/2020). I have discussed the diagnosis with the patient and the intended plan as seen in the above orders. The patient has received an after-visit summary and questions were answered concerning future plans. I have discussed medication side effects and warnings with the patient as well. I have reviewed the plan of care with the patient, accepted their input and they are in agreement with the treatment goals.        Deidra Estrada MD  March 23, 2020

## 2020-09-16 ENCOUNTER — VIRTUAL VISIT (OUTPATIENT)
Dept: FAMILY MEDICINE CLINIC | Age: 45
End: 2020-09-16

## 2020-09-16 DIAGNOSIS — M54.50 CHRONIC BILATERAL LOW BACK PAIN WITHOUT SCIATICA: Primary | ICD-10-CM

## 2020-09-16 DIAGNOSIS — G89.29 CHRONIC BILATERAL LOW BACK PAIN WITHOUT SCIATICA: Primary | ICD-10-CM

## 2020-09-16 NOTE — PROGRESS NOTES
Va Dominique is a 39 y.o. female who was seen by synchronous (real-time) audio-video technology using doxy. me on 9/16/2020. Location of the patient: Home    Location of the provider: [facility]    Consent:  She and/or health care decision maker is aware that that she may receive a bill for this telehealth service, depending on her insurance coverage, and has provided verbal consent to proceed: Yes    Subjective:   Va Dominique is a 39 y.o. female who presents today for management of    Chief Complaint   Patient presents with    Back Pain       Back Pain  Patient presents for presents evaluation of low back problems. Symptoms have been present for 3 days and include pain in lower back (excruciating, sharp in character; 9/10 in severity), associated with numbness on both LE (R>L), denies paresthesias. Pain has significantly improved. Numbness has resolved. Initial inciting event: none. Alleviating factors identifiable by patient are rest. Exacerbating factors identifiable by patient are standing, sitting, walking. Treatments so far initiated by patient: ibuprofen Previous lower back problems: yes. Previous workup: MRI of the lumbar spine many years ago in [place] - herniated disc L5-S1. Previous treatments: chiropractor, physical therapy, prednisone, NSAIDs. Problem List  Patient Active Problem List    Diagnosis Date Noted    Slow transit constipation 01/09/2020    Episodic lightheadedness 01/09/2020    Vitamin D deficiency 01/09/2020    Prediabetes     Lung nodule     Anxiety        Current Medications  Current Outpatient Medications   Medication Sig    Amitiza 24 mcg capsule TK ONE C PO  BID WITH FOOD    omeprazole (PRILOSEC) 20 mg capsule TK 1 C PO D 30 MINUTES TO 1 H BEFORE A MEAL    meclizine (ANTIVERT) 25 mg tablet Take 1 Tab by mouth three (3) times daily as needed for Dizziness.  gabapentin (NEURONTIN) 100 mg capsule Take  by mouth three (3) times daily.     Blood-Glucose Meter monitoring kit Use as needed    Blood Pressure Monitor (BLOOD PRESSURE KIT) kit Use as needed    glucose blood VI test strips (BLOOD GLUCOSE TEST) strip Use once daily as needed    lancets misc Use once daily as needed    DULoxetine (CYMBALTA) 20 mg capsule Take 20 mg by mouth daily. No current facility-administered medications for this visit. Allergies/Drug Reactions  Allergies   Allergen Reactions    Morphine Anaphylaxis        Social History  Social History     Tobacco Use    Smoking status: Never Smoker    Smokeless tobacco: Never Used   Substance Use Topics    Alcohol use: No     Frequency: Never    Drug use: No        Review of Systems  Review of Systems   Constitutional: Negative for fever. Respiratory: Negative for shortness of breath. Cardiovascular: Negative for chest pain, palpitations and leg swelling. Gastrointestinal: Negative for abdominal pain and diarrhea. Genitourinary: Negative for frequency and urgency. Musculoskeletal: Positive for back pain. Neurological: Negative for dizziness and headaches. Objective:     General: alert, cooperative, no distress   Mental  status: mental status: alert, oriented to person, place, and time, normal mood, behavior, speech, dress, motor activity, and thought processes   Resp: resp: normal effort and no respiratory distress   Neuro: neuro: no gross deficits   Skin: skin: no discoloration or lesions of concern on visible areas     Due to this being a TeleHealth evaluation, many elements of the physical examination are unable to be assessed. Assessment & Plan:   1.  Chronic bilateral low back pain without sciatica  - acute exacerbation almost completely resolved  - activity as tolerated  - NSAIDs as needed  - with prior history of lumbar herniated disc, patient is requesting to see an orthopedic specialist.  - REFERRAL TO SPINE SURGERY      Follow-up and Dispositions    · Return if symptoms worsen or fail to improve. We discussed the expected course, resolution and complications of the diagnosis(es) in detail. Medication risks, benefits, costs, interactions, and alternatives were discussed as indicated. I advised her to contact the office if her condition worsens, changes or fails to improve as anticipated. She expressed understanding with the diagnosis(es) and plan. Pursuant to the emergency declaration under the [address] [place] [address], [facility] waiver authority and the [employer] and [employer] Act, this Virtual  Visit was conducted, with patient's consent, to reduce the patient's risk of exposure to COVID-19 and provide continuity of care for an established patient. Services were provided through a video synchronous discussion virtually to substitute for in-person clinic visit.     Shama Aviles MD

## 2020-10-05 ENCOUNTER — TELEPHONE (OUTPATIENT)
Dept: FAMILY MEDICINE CLINIC | Age: 45
End: 2020-10-05

## 2020-10-05 NOTE — TELEPHONE ENCOUNTER
Pt. Went to [facility] on Friday and today and was tested for COVID that was negative and she stated they found something on left lung that may be pneumonia. She stated they were unable to do a CT scan and advised her to call PCP. Does pt. Need f/u? Or can Dr. FOY put in an order for CT scan.

## 2020-10-06 ENCOUNTER — PATIENT MESSAGE (OUTPATIENT)
Dept: FAMILY MEDICINE CLINIC | Age: 45
End: 2020-10-06

## 2020-10-07 ENCOUNTER — PATIENT MESSAGE (OUTPATIENT)
Dept: FAMILY MEDICINE CLINIC | Age: 45
End: 2020-10-07

## 2020-10-07 ENCOUNTER — VIRTUAL VISIT (OUTPATIENT)
Dept: FAMILY MEDICINE CLINIC | Age: 45
End: 2020-10-07
Payer: MEDICAID

## 2020-10-07 DIAGNOSIS — R06.2 WHEEZING: ICD-10-CM

## 2020-10-07 DIAGNOSIS — R91.1 LUNG NODULE: Primary | ICD-10-CM

## 2020-10-07 DIAGNOSIS — R91.1 LUNG NODULE: ICD-10-CM

## 2020-10-07 DIAGNOSIS — J18.9 PNEUMONIA OF LEFT LOWER LOBE DUE TO INFECTIOUS ORGANISM: ICD-10-CM

## 2020-10-07 PROCEDURE — 99213 OFFICE O/P EST LOW 20 MIN: CPT | Performed by: INTERNAL MEDICINE

## 2020-10-07 RX ORDER — ALBUTEROL SULFATE 90 UG/1
2 AEROSOL, METERED RESPIRATORY (INHALATION)
Qty: 1 INHALER | Refills: 0 | Status: SHIPPED | OUTPATIENT
Start: 2020-10-07 | End: 2021-06-29

## 2020-10-07 NOTE — PROGRESS NOTES
Sydnie Dunlap is a 39 y.o. female who was seen by synchronous (real-time) audio-video technology using doxy. me on 10/7/2020. Location of the patient: Home    Location of the provider: [facility]    Consent:  She and/or health care decision maker is aware that that she may receive a bill for this telehealth service, depending on her insurance coverage, and has provided verbal consent to proceed: Yes    Subjective:   Sydnie Dunlap is a 39 y.o. female who presents today for management of    Chief Complaint   Patient presents with    Cough       Patient presents for presents evaluation of nasal congestion and productive cough. Symptoms began 1 week ago and are unchanged since that time. Patient also reports of wheezing and clear sputum. She was seen at [facility] urgent care and was prescribed doxycycline and cough suppressant. COVID test was negative. Chest xray was negative. Past history is significant for lung nodules. Problem List  Patient Active Problem List    Diagnosis Date Noted    Slow transit constipation 01/09/2020    Episodic lightheadedness 01/09/2020    Vitamin D deficiency 01/09/2020    Prediabetes     Lung nodule     Anxiety        Current Medications  Current Outpatient Medications   Medication Sig    albuterol (PROVENTIL HFA, VENTOLIN HFA, PROAIR HFA) 90 mcg/actuation inhaler Take 2 Puffs by inhalation every six (6) hours as needed for Wheezing or Shortness of Breath.  Amitiza 24 mcg capsule TK ONE C PO  BID WITH FOOD    omeprazole (PRILOSEC) 20 mg capsule TK 1 C PO D 30 MINUTES TO 1 H BEFORE A MEAL    meclizine (ANTIVERT) 25 mg tablet Take 1 Tab by mouth three (3) times daily as needed for Dizziness.  gabapentin (NEURONTIN) 100 mg capsule Take  by mouth three (3) times daily.     Blood-Glucose Meter monitoring kit Use as needed    Blood Pressure Monitor (BLOOD PRESSURE KIT) kit Use as needed    glucose blood VI test strips (BLOOD GLUCOSE TEST) strip Use once daily as needed    lancets misc Use once daily as needed    DULoxetine (CYMBALTA) 20 mg capsule Take 20 mg by mouth daily. No current facility-administered medications for this visit. Allergies/Drug Reactions  Allergies   Allergen Reactions    Morphine Anaphylaxis        Social History  Social History     Tobacco Use    Smoking status: Never Smoker    Smokeless tobacco: Never Used   Substance Use Topics    Alcohol use: No     Frequency: Never    Drug use: No        Review of Systems  Review of Systems   Constitutional: Negative for chills, fever, malaise/fatigue and weight loss. Respiratory: Positive for cough, sputum production and wheezing. Cardiovascular: Negative for chest pain, palpitations and leg swelling. Gastrointestinal: Negative. Genitourinary: Negative. Musculoskeletal: Negative. Neurological: Negative for dizziness and headaches. Psychiatric/Behavioral: Negative for depression and suicidal ideas. Objective:     General: alert, cooperative, no distress   Mental  status: mental status: alert, oriented to person, place, and time, normal mood, behavior, speech, dress, motor activity, and thought processes   Resp: resp: normal effort and no respiratory distress   Neuro: neuro: no gross deficits   Skin: skin: no discoloration or lesions of concern on visible areas     Due to this being a TeleHealth evaluation, many elements of the physical examination are unable to be assessed. Assessment & Plan:   1. Lung nodule  - CT CHEST WO CONT; Future    2. Pneumonia of left lower lobe due to infectious organism  - finish course of antibiotics  - CT CHEST WO CONT; Future    3. Wheezing  - albuterol (PROVENTIL HFA, VENTOLIN HFA, PROAIR HFA) 90 mcg/actuation inhaler; Take 2 Puffs by inhalation every six (6) hours as needed for Wheezing or Shortness of Breath. Dispense: 1 Inhaler;  Refill: 0      Follow-up and Dispositions    · Return if symptoms worsen or fail to improve. We discussed the expected course, resolution and complications of the diagnosis(es) in detail. Medication risks, benefits, costs, interactions, and alternatives were discussed as indicated. I advised her to contact the office if her condition worsens, changes or fails to improve as anticipated. She expressed understanding with the diagnosis(es) and plan. Pursuant to the emergency declaration under the [address] [place] [address] waiver authority and the [employer] and [employer] Act, this Virtual  Visit was conducted, with patient's consent, to reduce the patient's risk of exposure to COVID-19 and provide continuity of care for an established patient. Services were provided through a video synchronous discussion virtually to substitute for in-person clinic visit.     Melo Webster MD

## 2020-10-07 NOTE — LETTER
NOTIFICATION RETURN TO WORK / SCHOOL 
 
10/7/2020 8:17 AM 
 
Ms. aSndra Beard [address] [facility] [id number] To Whom It May Concern: 
 
Sandra Beard is currently under the care of [facility]. She will return to work/school on: 10/12/2020 If there are questions or concerns please have the patient contact our office. Sincerely, Santosh Boone MD

## 2020-10-07 NOTE — TELEPHONE ENCOUNTER
Called patient and advised her that she does not need a STAT chest CT scan. Advised to continue antibiotics, start albuterol and follow-up in 5 days if not feeling better. Chest CT scan is scheduled on 10/14 for follow-up of lung nodules. Patient verbalized understanding.

## 2020-10-14 ENCOUNTER — HOSPITAL ENCOUNTER (OUTPATIENT)
Dept: CT IMAGING | Age: 45
Discharge: HOME | End: 2020-10-14
Attending: INTERNAL MEDICINE
Payer: MEDICAID

## 2020-10-14 PROCEDURE — 71250 CT THORAX DX C-: CPT

## 2020-10-15 ENCOUNTER — PATIENT MESSAGE (OUTPATIENT)
Dept: FAMILY MEDICINE CLINIC | Age: 45
End: 2020-10-15

## 2020-10-15 DIAGNOSIS — B37.31 VAGINAL CANDIDIASIS: Primary | ICD-10-CM

## 2020-10-15 RX ORDER — FLUCONAZOLE 150 MG/1
150 TABLET ORAL
Qty: 2 TAB | Refills: 0 | Status: SHIPPED | OUTPATIENT
Start: 2020-10-15 | End: 2020-10-19

## 2021-06-22 ENCOUNTER — PATIENT MESSAGE (OUTPATIENT)
Dept: FAMILY MEDICINE CLINIC | Age: 46
End: 2021-06-22

## 2021-06-22 ENCOUNTER — TELEPHONE (OUTPATIENT)
Dept: FAMILY MEDICINE CLINIC | Age: 46
End: 2021-06-22

## 2021-06-22 NOTE — TELEPHONE ENCOUNTER
This will be marked as duplicated, patient mychart request and nurse will complete documentation on original message.

## 2021-06-29 ENCOUNTER — OFFICE VISIT (OUTPATIENT)
Dept: FAMILY MEDICINE CLINIC | Age: 46
End: 2021-06-29
Payer: MEDICAID

## 2021-06-29 VITALS
WEIGHT: 191 LBS | DIASTOLIC BLOOD PRESSURE: 71 MMHG | SYSTOLIC BLOOD PRESSURE: 106 MMHG | HEIGHT: 62 IN | OXYGEN SATURATION: 98 % | BODY MASS INDEX: 35.15 KG/M2 | HEART RATE: 81 BPM | RESPIRATION RATE: 16 BRPM | TEMPERATURE: 98.2 F

## 2021-06-29 DIAGNOSIS — R73.03 PREDIABETES: ICD-10-CM

## 2021-06-29 DIAGNOSIS — R00.2 INTERMITTENT PALPITATIONS: ICD-10-CM

## 2021-06-29 DIAGNOSIS — R53.82 CHRONIC FATIGUE: ICD-10-CM

## 2021-06-29 DIAGNOSIS — H53.9 EPISODE OF VISUAL DISTURBANCE: ICD-10-CM

## 2021-06-29 DIAGNOSIS — F41.0 PANIC DISORDER: ICD-10-CM

## 2021-06-29 DIAGNOSIS — K75.81 NASH (NONALCOHOLIC STEATOHEPATITIS): Primary | ICD-10-CM

## 2021-06-29 PROCEDURE — 99214 OFFICE O/P EST MOD 30 MIN: CPT | Performed by: INTERNAL MEDICINE

## 2021-06-29 RX ORDER — MELATONIN
COMMUNITY
Start: 2021-06-21 | End: 2022-04-04 | Stop reason: ALTCHOICE

## 2021-06-29 RX ORDER — ASPIRIN 81 MG/1
81 TABLET ORAL DAILY
Qty: 90 TABLET | Refills: 3 | Status: SHIPPED | OUTPATIENT
Start: 2021-06-29

## 2021-06-29 RX ORDER — HYDROXYZINE 25 MG/1
25 TABLET, FILM COATED ORAL
Qty: 30 TABLET | Refills: 0 | Status: SHIPPED | OUTPATIENT
Start: 2021-06-29 | End: 2021-08-10 | Stop reason: SINTOL

## 2021-06-29 RX ORDER — ESCITALOPRAM OXALATE 10 MG/1
10 TABLET ORAL DAILY
Qty: 30 TABLET | Refills: 0 | Status: SHIPPED | OUTPATIENT
Start: 2021-06-29 | End: 2021-11-08

## 2021-06-29 RX ORDER — LIDOCAINE 50 MG/G
PATCH TOPICAL
COMMUNITY
Start: 2021-06-21 | End: 2021-06-29

## 2021-06-29 RX ORDER — LANOLIN ALCOHOL/MO/W.PET/CERES
1000 CREAM (GRAM) TOPICAL DAILY
Qty: 90 TABLET | Refills: 3 | Status: SHIPPED | OUTPATIENT
Start: 2021-06-29 | End: 2022-05-12 | Stop reason: SDUPTHER

## 2021-06-29 NOTE — PROGRESS NOTES
History of Present Illness  Derrell Chew is a 42 y.o. female who presents today for management of    Chief Complaint   Patient presents with   [facility] Follow Up       Patient presents for follow-up after being admitted overnight at [facility] on June 20-21, 2021 for vision changes x 30 minutes, occipital headache and palpitations. Patient has history of PFO closure, migraine, anxiety and DEE. CT if the head, carotid duplex, MRI and MRA of the brainn and neck were unremarkable. EKG showed normal sinus rhythm. She was told that it may a TIA. Patient was seen by neurology. Report not available at the time of visit. She was prescribed gabapentin for anxiety. Patient reports of anxiety. She has on and off palpitations, shaking, dizziness and difficulty. She had incomplete cardiac evaluation in 2020. Patient was lost to follow-up. She reports of having a positive tilt-table test in 2005. Problem List  Patient Active Problem List    Diagnosis Date Noted    DEE (nonalcoholic steatohepatitis) 06/29/2021    Slow transit constipation 01/09/2020    Episodic lightheadedness 01/09/2020    Vitamin D deficiency 01/09/2020    Prediabetes     Lung nodule     Anxiety        Current Medications  Current Outpatient Medications   Medication Sig    hydrOXYzine HCL (ATARAX) 25 mg tablet Take 1 Tablet by mouth every eight (8) hours as needed for Anxiety.  escitalopram oxalate (LEXAPRO) 10 mg tablet Take 1 Tablet by mouth daily.  cyanocobalamin 1,000 mcg tablet Take 1 Tablet by mouth daily.  aspirin delayed-release 81 mg tablet Take 1 Tablet by mouth daily.  cholecalciferol (VITAMIN D3) (1000 Units /25 mcg) tablet TAKE 1 TABLET BY MOUTH ONCE A DAY    gabapentin (NEURONTIN) 100 mg capsule Take  by mouth three (3) times daily. (Patient not taking: Reported on 6/29/2021)     No current facility-administered medications for this visit.        Allergies/Drug Reactions  Allergies Allergen Reactions    Morphine Anaphylaxis        Review of Systems  Review of Systems   Constitutional: Negative. Respiratory: Negative. Cardiovascular: Positive for palpitations. Negative for chest pain. Gastrointestinal: Negative. Musculoskeletal: Negative. Neurological: Positive for dizziness. Psychiatric/Behavioral: Negative for depression. The patient is nervous/anxious.          Physical Exam  Vital signs:   Vitals:    06/29/21 0818   BP: 106/71   Pulse: 81   Resp: 16   Temp: 98.2 °F (36.8 °C)   TempSrc: Temporal   SpO2: 98%   Weight: 191 lb (86.6 kg)   Height: 5' 2\" (1.575 m)       General: alert, oriented, not in distress  Head: scalp normal, atraumatic  Eyes: pupils are equal and reactive, full and intact EOM's  Neck: supple, no JVD, no lymphadenopathy, non-palpable thyroid  Chest/Lungs: clear breath sounds, no wheezing or crackles  Heart: normal rate, regular rhythm, no murmur  Abdomen: soft, non-distended, non-tender, normal bowel sounds, no organomegaly, no masses  Extremities: no focal deformities, no edema  Skin: no active skin lesions      Laboratory/Tests:  HEPATIC FUNCTION PANEL  Specimen:  Blood - Blood (substance)   Ref Range & Units 8 d ago   Albumin 3.5 - 5.0 g/dL 3.6        Total Protein 6.4 - 8.3 g/dL 6.2Low         Globulin 2.0 - 4.0 g/dL 2.6        A/G Ratio 1.1 - 2.6 ratio 1.4        Bilirubin Total 0.2 - 1.2 mg/dL 0.2        Bilirubin Direct 0.0 - 0.3 mg/dL <0.2        SGOT (AST) 10 - 37 U/L 12        Alkaline Phosphatase 25 - 115 U/L 60        SGPT (ALT) 5 - 40 U/L 9    Lipid Complete Panel   Specimen:  Blood - Blood (substance)   Ref Range & Units 8 d ago   Cholesterol 110 - 200 mg/dL 152        Triglyceride 40 - 149 mg/dL 47        HDL >=40 mg/dL 54        Cholesterol/HDL 0.0 - 5.0  2.8        Non-HDL Cholesterol <130 mg/dL 98        LDL CALCULATION 50 - 99 mg/dL 89        VLDL CALCULATION 8 - 30 mg/dL 9        LDL/HDL Ratio   1.6      THYROID CASCADE  Specimen: Blood - Blood (substance)   Ref Range & Units 8 d ago   TSH 0.27 - 4.20 mcU/mL 6.05    BASIC METABOLIC PANEL  Specimen:  Blood - Blood (substance)   Ref Range & Units 8 d ago Comments   Potassium 3.5 - 5.5 mmol/L 3.9          Sodium 133 - 145 mmol/L 139          Chloride 98 - 110 mmol/L 104          Glucose 70 - 99 mg/dL 97          Calcium 8.4 - 10.5 mg/dL 9.0          BUN 6 - 22 mg/dL 14          Creatinine 0.5 - 1.2 mg/dL 0.8          CO2 20 - 32 mmol/L 25          eGFR  >60.0  >60.0          eGFR Non African American >60.0  >60.0          Anion Gap 3.0 - 15.0 mmol/L 9.7     CBC WITH DIFFERENTIAL AUTO  Specimen:  Blood - Blood (substance)   Ref Range & Units 8 d ago   WBC x 10*3 4.0 - 11.0 K/uL 6.5        RBC x 10^6 3. 80 - 5.20 M/uL 3.94        HGB 11.7 - 15.5 g/dL 11.7        HCT 35.1 - 46.5 % 35.4        MCV 81 - 99 fL 90        MCH 26 - 34 pg 30        MCHC 31 - 36 g/dL 33        RDW 10.0 - 15.5 % 12.4        Platelet 491 - 617 K/uL 308        MPV 9.0 - 13.0 fL 11.6        Segmented Neutrophils (Auto) 40 - 75 % 51        Lymphocytes (Auto) 20 - 45 % 39        Monocytes (Auto) 3 - 12 % 7        Eosinophils (Auto) 0 - 6 % 3        Basophils (Auto) 0 - 2 % 1        Absolute Neutrophils (Auto) 1.8 - 7.7 K/uL 3.3        Absolute Lymphocytes (Auto) 1.0 - 4.8 K/uL 2.5        Absolute Monocytes (Auto) 0.1 - 1.0 K/uL 0.5        Absolute Eosinophils (Auto) 0.0 - 0.5 K/uL 0.2        Absolute Basophils (Auto) 0.0 - 0.2 K/uL 0.1        Assessment/Plan:    1. Episode of visual disturbance  - resolved  - ? TIA vs migraine  - neurology follow-up  - start aspirin 81mg daily    2. Panic disorder  - trial of hydrOXYzine HCL (ATARAX) 25 mg tablet; Take 1 Tablet by mouth every eight (8) hours as needed for Anxiety. Dispense: 30 Tablet; Refill: 0  - start escitalopram oxalate (LEXAPRO) 10 mg tablet; Take 1 Tablet by mouth daily. Dispense: 30 Tablet; Refill: 0    3.  DEE (nonalcoholic steatohepatitis)  - stable    4. Intermittent palpitations  - encouraged patient to follow-up with cardiology to complete cardiac work-up: needs cardiac event monitoring    5. Prediabetes  - HEMOGLOBIN A1C WITH EAG; Future  - HEMOGLOBIN A1C WITH EAG    6. Chronic fatigue  - VITAMIN B12; Future  - cyanocobalamin 1,000 mcg tablet; Take 1 Tablet by mouth daily. Dispense: 90 Tablet; Refill: 3  - VITAMIN B12      Follow-up and Dispositions    · Return in about 6 weeks (around 8/10/2021) for follow-up, in-person. I have discussed the diagnosis with the patient and the intended plan as seen in the above orders. The patient has received an after-visit summary and questions were answered concerning future plans. I have discussed medication side effects and warnings with the patient as well. I have reviewed the plan of care with the patient, accepted their input and they are in agreement with the treatment goals.        Miya Lim MD  June 29, 2021

## 2021-06-29 NOTE — PROGRESS NOTES
Betina Mera presents today for hospital follow up   - Is someone accompanying this pt? no  - Is the patient using any durable medical equipment during office visit? no    Coordination of Care:  1. Have you been to the ER, urgent care clinic since your last visit? Hospitalized since your last visit? 6/20 rosa    2. Have you seen or consulted any other health care providers outside of the [address] [place] [address] since your last visit? Include any pap smears or colon screening.  neurology

## 2021-06-29 NOTE — LETTER
NOTIFICATION RETURN TO WORK / SCHOOL    6/29/2021 8:58 AM    Ms. Joao Beavers  [address]  [facility] [id number]      To Whom It May Concern:    Joao Beavers is currently under the care of [facility]. She was seen in the office today. She will return to work/school on: 06/29/2021    If there are questions or concerns please have the patient contact our office.         Sincerely,      Nereyda Patel MD

## 2021-07-01 LAB
AVG GLU, 10930: 113 MG/DL (ref 91–123)
HBA1C MFR BLD HPLC: 5.6 % (ref 4.8–5.6)
VIT B12 SERPL-MCNC: 317 PG/ML (ref 211–911)

## 2021-07-02 PROBLEM — E53.8 VITAMIN B12 DEFICIENCY: Status: ACTIVE | Noted: 2021-07-02

## 2021-08-10 ENCOUNTER — OFFICE VISIT (OUTPATIENT)
Dept: FAMILY MEDICINE CLINIC | Age: 46
End: 2021-08-10
Payer: MEDICAID

## 2021-08-10 VITALS
HEIGHT: 62 IN | TEMPERATURE: 98.6 F | BODY MASS INDEX: 35.7 KG/M2 | SYSTOLIC BLOOD PRESSURE: 119 MMHG | WEIGHT: 194 LBS | HEART RATE: 86 BPM | DIASTOLIC BLOOD PRESSURE: 86 MMHG | OXYGEN SATURATION: 99 % | RESPIRATION RATE: 15 BRPM

## 2021-08-10 DIAGNOSIS — M75.42 IMPINGEMENT SYNDROME OF LEFT SHOULDER: ICD-10-CM

## 2021-08-10 DIAGNOSIS — M25.512 ACUTE PAIN OF LEFT SHOULDER: ICD-10-CM

## 2021-08-10 DIAGNOSIS — F41.9 ANXIETY: Primary | ICD-10-CM

## 2021-08-10 DIAGNOSIS — K21.9 CHRONIC GERD: ICD-10-CM

## 2021-08-10 PROCEDURE — 99214 OFFICE O/P EST MOD 30 MIN: CPT | Performed by: INTERNAL MEDICINE

## 2021-08-10 RX ORDER — OMEPRAZOLE 20 MG/1
20 CAPSULE, DELAYED RELEASE ORAL DAILY
Qty: 90 CAPSULE | Refills: 0 | Status: SHIPPED | OUTPATIENT
Start: 2021-08-10 | End: 2022-05-12 | Stop reason: SDUPTHER

## 2021-08-10 RX ORDER — LORAZEPAM 0.5 MG/1
0.25 TABLET ORAL
Qty: 20 TABLET | Refills: 0 | Status: SHIPPED | OUTPATIENT
Start: 2021-08-10 | End: 2021-11-08

## 2021-08-10 RX ORDER — IBUPROFEN 800 MG/1
800 TABLET ORAL
Qty: 20 TABLET | Refills: 0 | Status: SHIPPED | OUTPATIENT
Start: 2021-08-10 | End: 2021-11-08 | Stop reason: SDUPTHER

## 2021-08-10 NOTE — PROGRESS NOTES
History of Present Illness  Carroll Andre is a 42 y.o. female who presents today for management of    Chief Complaint   Patient presents with    Arm Pain     left arm, sharp pain    Anxiety       Shoulder Pain  Patient complains of left shoulder pain. The symptoms began 4 days ago Course of symptoms since onset has been gradually improving. Pain is described as overall severity = severe, location: anterior shoulder and other associated symptoms: unable to clasp bra. Symptoms were incited by no known event. Patient denies hematemesis, melena, alcohol overuse. Therapy to date includes rest, heat, ibuprofen 800mg. Anxiety Review:  Patient is seen for anxiety disorder. Current treatment includes none. Ongoing symptoms include: palpitations, sweating, chest pain, shortness of breath. Patient reports of having panic attacks when crossing [facility] [place]. She just [place] a house [place] the [place]. Patient denies: suicidal ideation, homocidal ideation. GERD  Patient complains of gastroesophageal reflux with heartburn. Symptoms have been present for several years. She denies dysphagia. She  has not lost weight. She denies melena, hematochezia, hematemesis, and coffee ground emesis. She denies difficulty swallowing, hematemesis and melena. Medical therapy in the past has included omeprazole . Problem List  Patient Active Problem List    Diagnosis Date Noted    Vitamin B12 deficiency 07/02/2021    DEE (nonalcoholic steatohepatitis) 06/29/2021    Slow transit constipation 01/09/2020    Episodic lightheadedness 01/09/2020    Vitamin D deficiency 01/09/2020    Prediabetes     Lung nodule     Anxiety        Current Medications  Current Outpatient Medications   Medication Sig    omeprazole (PRILOSEC) 20 mg capsule Take 1 Capsule by mouth daily.  LORazepam (Ativan) 0.5 mg tablet Take 0.5 Tablets by mouth every eight (8) hours as needed for Anxiety.  Max Daily Amount: 0.75 mg.   Wiltraceeminia Blazing ibuprofen (MOTRIN) 800 mg tablet Take 1 Tablet by mouth every eight (8) hours as needed for Pain.  cholecalciferol (VITAMIN D3) (1000 Units /25 mcg) tablet TAKE 1 TABLET BY MOUTH ONCE A DAY    escitalopram oxalate (LEXAPRO) 10 mg tablet Take 1 Tablet by mouth daily.  cyanocobalamin 1,000 mcg tablet Take 1 Tablet by mouth daily.  aspirin delayed-release 81 mg tablet Take 1 Tablet by mouth daily. No current facility-administered medications for this visit. Allergies/Drug Reactions  Allergies   Allergen Reactions    Morphine Anaphylaxis        Review of Systems  Review of Systems   Constitutional: Negative. Respiratory: Negative. Cardiovascular: Negative. Gastrointestinal: Positive for heartburn. Negative for abdominal pain, nausea and vomiting. Musculoskeletal: Positive for joint pain. Neurological: Negative. Psychiatric/Behavioral: Negative for depression, substance abuse and suicidal ideas. The patient is nervous/anxious.          Physical Exam  Vital signs:   Vitals:    08/10/21 0848   BP: 119/86   Pulse: 86   Resp: 15   Temp: 98.6 °F (37 °C)   TempSrc: Temporal   SpO2: 99%   Weight: 194 lb (88 kg)   Height: 5' 2\" (1.575 m)       General: alert, oriented, not in distress  Head: scalp normal, atraumatic  Neck: supple, no JVD, no lymphadenopathy, non-palpable thyroid  Chest/Lungs: clear breath sounds, no wheezing or crackles  Heart: normal rate, regular rhythm, no murmur  Extremities: no focal deformities, no edema, limited ROM of left shoulder  Skin: no active skin lesions    Laboratory/Tests:  Lab Results   Component Value Date/Time    WBC 5.1 12/11/2018 11:04 AM    HGB 12.7 12/11/2018 11:04 AM    HCT 41.0 12/11/2018 11:04 AM    PLATELET 970 00/19/7541 11:04 AM    MCV 94 12/11/2018 11:04 AM     Lab Results   Component Value Date/Time    TSH 2.27 12/11/2018 11:04 AM      Lab Results   Component Value Date/Time    Sodium 137 01/09/2020 04:26 PM    Potassium 4.5 01/09/2020 04:26 PM Chloride 100 01/09/2020 04:26 PM    CO2 27 01/09/2020 04:26 PM    Anion gap 10.0 01/09/2020 04:26 PM    Glucose 103 (H) 01/09/2020 04:26 PM    BUN 12 01/09/2020 04:26 PM    Creatinine 0.7 01/09/2020 04:26 PM    Calcium 9.7 01/09/2020 04:26 PM         Assessment/Plan:    1. Acute pain of left shoulder  - rest, ice, pain control  - start PT  - ibuprofen (MOTRIN) 800 mg tablet; Take 1 Tablet by mouth every eight (8) hours as needed for Pain. Dispense: 20 Tablet; Refill: 0    2. Impingement syndrome of left shoulder  - REFERRAL TO PHYSICAL THERAPY    3. Anxiety  - start escitalopram  - trial of LORazepam (Ativan) 0.5 mg tablet; Take 0.5 Tablets by mouth every eight (8) hours as needed for Anxiety. Max Daily Amount: 0.75 mg. Dispense: 20 Tablet; Refill: 0  - REFERRAL TO PSYCHIATRY    4. Chronic GERD  - omeprazole (PRILOSEC) 20 mg capsule; Take 1 Capsule by mouth daily. Dispense: 90 Capsule; Refill: 0      Follow-up and Dispositions    · Return in about 6 weeks (around 9/21/2021) for follow-up. I have discussed the diagnosis with the patient and the intended plan as seen in the above orders. The patient has received an after-visit summary and questions were answered concerning future plans. I have discussed medication side effects and warnings with the patient as well. I have reviewed the plan of care with the patient, accepted their input and they are in agreement with the treatment goals.        Kodi Marquez MD  August 10, 2021

## 2021-08-10 NOTE — LETTER
NOTIFICATION RETURN TO WORK / SCHOOL        Ms. Luzmaria Treviño  [address]  [facility] [phone]        To Whom It May Concern:    Luzmaria Treviño is currently under the care of [address] [place] [address] [facility]. She was seen today on 8/10/21. If there are questions or concerns please have the patient contact our office.         Sincerely,          Raina Gates MD

## 2021-08-10 NOTE — PROGRESS NOTES
Chris Crews presents today for anxiety, left arm pain   - Is someone accompanying this pt? no  - Is the patient using any durable medical equipment during office visit? no    Coordination of Care:  1. Have you been to the ER, urgent care clinic since your last visit? Hospitalized since your last visit? no    2. Have you seen or consulted any other health care providers outside of the [address] [place] [address] since your last visit? Include any pap smears or colon screening.  no

## 2021-10-13 ENCOUNTER — PATIENT MESSAGE (OUTPATIENT)
Dept: FAMILY MEDICINE CLINIC | Age: 46
End: 2021-10-13

## 2021-10-14 ENCOUNTER — PATIENT MESSAGE (OUTPATIENT)
Dept: FAMILY MEDICINE CLINIC | Age: 46
End: 2021-10-14

## 2021-10-14 ENCOUNTER — TELEPHONE (OUTPATIENT)
Dept: FAMILY MEDICINE CLINIC | Age: 46
End: 2021-10-14

## 2021-10-14 DIAGNOSIS — M54.2 ACUTE NECK PAIN: Primary | ICD-10-CM

## 2021-10-14 NOTE — TELEPHONE ENCOUNTER
Pt states left msg on MyChart earlier today. States she is in severe pain from the top of her neck down to her back and wants to s/w Dr. FOY to see if she can send a Rx to pharmacy. Pt also states she's been using the Lanacane patches, but they are no longer working.   Please assist.

## 2021-10-14 NOTE — TELEPHONE ENCOUNTER
Please advise patient to seek care at an urgent care if she is in severe pain. Unfortunately, we have a full schedule today.

## 2021-10-15 RX ORDER — CYCLOBENZAPRINE HCL 5 MG
5-10 TABLET ORAL
Qty: 30 TABLET | Refills: 0 | Status: SHIPPED | OUTPATIENT
Start: 2021-10-15 | End: 2021-11-08

## 2021-11-08 ENCOUNTER — OFFICE VISIT (OUTPATIENT)
Dept: FAMILY MEDICINE CLINIC | Age: 46
End: 2021-11-08
Payer: MEDICAID

## 2021-11-08 VITALS
OXYGEN SATURATION: 98 % | HEIGHT: 62 IN | SYSTOLIC BLOOD PRESSURE: 119 MMHG | DIASTOLIC BLOOD PRESSURE: 76 MMHG | HEART RATE: 73 BPM | TEMPERATURE: 97 F | WEIGHT: 202.2 LBS | BODY MASS INDEX: 37.21 KG/M2

## 2021-11-08 DIAGNOSIS — R42 EPISODIC LIGHTHEADEDNESS: ICD-10-CM

## 2021-11-08 DIAGNOSIS — F41.9 ANXIETY: ICD-10-CM

## 2021-11-08 DIAGNOSIS — G89.29 CHRONIC CERVICAL PAIN: Primary | ICD-10-CM

## 2021-11-08 DIAGNOSIS — M54.2 CHRONIC CERVICAL PAIN: Primary | ICD-10-CM

## 2021-11-08 DIAGNOSIS — Z12.31 ENCOUNTER FOR SCREENING MAMMOGRAM FOR MALIGNANT NEOPLASM OF BREAST: ICD-10-CM

## 2021-11-08 DIAGNOSIS — M79.7 FIBROMYALGIA: ICD-10-CM

## 2021-11-08 PROCEDURE — 99214 OFFICE O/P EST MOD 30 MIN: CPT | Performed by: INTERNAL MEDICINE

## 2021-11-08 RX ORDER — LORAZEPAM 0.5 MG/1
0.25 TABLET ORAL
Qty: 20 TABLET | Refills: 0 | Status: CANCELLED | OUTPATIENT
Start: 2021-11-08

## 2021-11-08 RX ORDER — CYCLOBENZAPRINE HCL 10 MG
10 TABLET ORAL
Qty: 40 TABLET | Refills: 0 | Status: SHIPPED | OUTPATIENT
Start: 2021-11-08 | End: 2022-05-12 | Stop reason: SDUPTHER

## 2021-11-08 RX ORDER — IBUPROFEN 800 MG/1
800 TABLET ORAL
Qty: 30 TABLET | Refills: 0 | Status: SHIPPED | OUTPATIENT
Start: 2021-11-08 | End: 2022-02-02 | Stop reason: SDUPTHER
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
## 2021-11-08 NOTE — LETTER
11/8/2021 12:15 PM    Ms. Rosales Lindsey  [address]  [facility] [phone]      To Whom It May Concern,    Miss Linda Bender is an established patient at [facility]. Due to her medical condition, she is recommended to stay hydrated and keep a bottle of water with her at all times. If you have any questions or concerns, please have the patient contact my office at [phone].       Sincerely,      Jane Felton MD

## 2021-11-08 NOTE — PROGRESS NOTES
Chief Complaint   Patient presents with    Shoulder Pain     10/10 right shoulder for two months getting worse, alternate to left shoulder    Fatigue     more than 3 months    Other     request mammogram order and a job letter       1. Have you been to the ER, urgent care clinic since your last visit? Hospitalized since your last visit? No    2. Have you seen or consulted any other health care providers outside of the [address] [place] [address] since your last visit? Include any pap smears or colon screening. Yes, Ob[facility]yn last month     Health Maintenance Due   Topic Date Due    Hepatitis C Screening  Never done    COVID-19 Vaccine (1) Never done    DTaP/Tdap/Td series (1 - Tdap) Never done    Flu Vaccine (1) Never done     Not taken covid 19 vaccine   Patient declined flu vaccine.  Allergic reaction in 2001

## 2022-01-14 DIAGNOSIS — R05.9 COUGH: Primary | ICD-10-CM

## 2022-01-14 DIAGNOSIS — J02.9 SORE THROAT: ICD-10-CM

## 2022-01-14 RX ORDER — BENZONATATE 200 MG/1
200 CAPSULE ORAL
Qty: 21 CAPSULE | Refills: 0 | Status: SHIPPED | OUTPATIENT
Start: 2022-01-14 | End: 2022-01-21

## 2022-02-02 DIAGNOSIS — G89.29 CHRONIC CERVICAL PAIN: ICD-10-CM

## 2022-02-02 DIAGNOSIS — M54.2 CHRONIC CERVICAL PAIN: ICD-10-CM

## 2022-02-02 NOTE — TELEPHONE ENCOUNTER
Requested Prescriptions     Pending Prescriptions Disp Refills    ibuprofen (MOTRIN) 800 mg tablet 30 Tablet 0     Sig: Take 1 Tablet by mouth every eight (8) hours as needed for Pain. Pt is requesting a refill on this medication. She had to reschedule her appt for tomorrow due to her having covid. Please advise.      Future Appointments   Date Time Provider Thierry Barroso   5/5/2022  2:00 PM Anabell Urrutia NP [facility]

## 2022-02-04 RX ORDER — IBUPROFEN 800 MG/1
800 TABLET ORAL
Qty: 30 TABLET | Refills: 0 | Status: SHIPPED | OUTPATIENT
Start: 2022-02-04 | End: 2022-05-12 | Stop reason: SDUPTHER

## 2022-03-02 ENCOUNTER — NURSE TRIAGE (OUTPATIENT)
Dept: OTHER | Facility: CLINIC | Age: 47
End: 2022-03-02

## 2022-03-02 NOTE — TELEPHONE ENCOUNTER
Received call from Wilberto Tinsley at [facility] with Red Flag Complaint. Subjective: Caller states \"PCP left, she is not established yet. Has a new patient appt 4/4/2022 with Wendy Terrazas. Patient also has a cardiologist. Patient is really SOB since Jan when she had COVID. Cardiologist wants her to have a chest x-ray and a full blood panel. \"     Current Symptoms: SOB with exertion    Onset: 2 months ago; sudden    Associated Symptoms: increased sleepiness    Pain Severity: denies; tightness; intermittent    Temperature: patient unsure by parent's tactile estimate    What has been tried: 800 ibuprofen     LMP: 2/18/2022 Pregnant: No    Recommended disposition: Go to Office Now If unable to be seen in the office go to C/ED for care. Care advice provided, patient verbalizes understanding; denies any other questions or concerns; instructed to call back for any new or worsening symptoms. Patient/Caller agrees with recommended disposition; writer provided warm transfer to [facility] at [facility] for appointment scheduling    Attention Provider: Thank you for allowing me to participate in the care of your patient. The patient was connected to triage in response to information provided to the [facility]. Please do not respond through this encounter as the response is not directed to a shared pool. Reason for Disposition   MILD difficulty breathing (e.g., minimal/no SOB at rest, SOB with walking, pulse < 100) of new-onset or worse than normal    Answer Assessment - Initial Assessment Questions  1. RESPIRATORY STATUS: \"Describe your breathing? \" (e.g., wheezing, shortness of breath, unable to speak, severe coughing)       SOB on exertion mainly. Sometimes when she is sitting still. Today she is not SOB    2. ONSET: \"When did this breathing problem begin? \"       2 months ago after having COVID    3. PATTERN \"Does the difficult breathing come and go, or has it been constant since it started? \"       Comes and goes    4. SEVERITY: \"How bad is your breathing? \" (e.g., mild, moderate, severe)     - MILD: No SOB at rest, mild SOB with walking, speaks normally in sentences, can lay down, no retractions, pulse < 100.     - MODERATE: SOB at rest, SOB with minimal exertion and prefers to sit, cannot lie down flat, speaks in phrases, mild retractions, audible wheezing, pulse 100-120.     - SEVERE: Very SOB at rest, speaks in single words, struggling to breathe, sitting hunched forward, retractions, pulse > 120       Mild    5. RECURRENT SYMPTOM: \"Have you had difficulty breathing before? \" If Yes, ask: \"When was the last time? \" and \"What happened that time? \"       Sometimes    6. CARDIAC HISTORY: \"Do you have any history of heart disease? \" (e.g., heart attack, angina, bypass surgery, angioplasty)       Yes see's a cardiologist    7. LUNG HISTORY: \"Do you have any history of lung disease? \"  (e.g., pulmonary embolus, asthma, emphysema)      Asthma as a child. Bronchitis. Lung nodules. 8. CAUSE: \"What do you think is causing the breathing problem? \"       Unsure    9. OTHER SYMPTOMS: \"Do you have any other symptoms? (e.g., dizziness, runny nose, cough, chest pain, fever)      Chest tightness that goes into the back, fatigue    10. PREGNANCY: \"Is there any chance you are pregnant? \" \"When was your last menstrual period? \"        2/18/2022    11. TRAVEL: \"Have you traveled out of the country in the last month? \" (e.g., travel history, exposures)        Patient denies    Protocols used: BREATHING DIFFICULTY-ADULT-[place]

## 2022-03-18 PROBLEM — M54.2 CHRONIC CERVICAL PAIN: Status: ACTIVE | Noted: 2021-11-08

## 2022-03-18 PROBLEM — G89.29 CHRONIC CERVICAL PAIN: Status: ACTIVE | Noted: 2021-11-08

## 2022-03-18 PROBLEM — K59.01 SLOW TRANSIT CONSTIPATION: Status: ACTIVE | Noted: 2020-01-09

## 2022-03-19 PROBLEM — K75.81 NASH (NONALCOHOLIC STEATOHEPATITIS): Status: ACTIVE | Noted: 2021-06-29

## 2022-03-19 PROBLEM — E55.9 VITAMIN D DEFICIENCY: Status: ACTIVE | Noted: 2020-01-09

## 2022-03-19 PROBLEM — M79.7 FIBROMYALGIA: Status: ACTIVE | Noted: 2021-11-08

## 2022-03-20 PROBLEM — E53.8 VITAMIN B12 DEFICIENCY: Status: ACTIVE | Noted: 2021-07-02

## 2022-03-31 ENCOUNTER — HOSPITAL ENCOUNTER (OUTPATIENT)
Dept: LAB | Age: 47
Discharge: HOME | End: 2022-03-31

## 2022-03-31 DIAGNOSIS — Z13.220 SCREENING FOR HYPERLIPIDEMIA: ICD-10-CM

## 2022-03-31 DIAGNOSIS — Z00.00 PREVENTATIVE HEALTH CARE: ICD-10-CM

## 2022-03-31 DIAGNOSIS — E53.8 VITAMIN B12 DEFICIENCY: ICD-10-CM

## 2022-03-31 DIAGNOSIS — Z13.29 SCREENING FOR THYROID DISORDER: ICD-10-CM

## 2022-03-31 DIAGNOSIS — E55.9 VITAMIN D DEFICIENCY: ICD-10-CM

## 2022-03-31 DIAGNOSIS — R73.03 PREDIABETES: ICD-10-CM

## 2022-03-31 DIAGNOSIS — R73.03 PREDIABETES: Primary | ICD-10-CM

## 2022-03-31 LAB
SENTARA SPECIMEN COL,SENBCF: NORMAL
SENTARA SPECIMEN COL,SENBCF: NORMAL

## 2022-03-31 PROCEDURE — 99001 SPECIMEN HANDLING PT-LAB: CPT

## 2022-04-01 LAB
25(OH)D3 SERPL-MCNC: 27.3 NG/ML (ref 32–100)
A-G RATIO,AGRAT: 1.4 RATIO (ref 1.1–2.6)
ABSOLUTE LYMPHOCYTE COUNT, 10803: 1.4 K/UL (ref 1–4.8)
ALBUMIN SERPL-MCNC: 4.2 G/DL (ref 3.5–5)
ALP SERPL-CCNC: 72 U/L (ref 25–115)
ALT SERPL-CCNC: 15 U/L (ref 5–40)
ANION GAP SERPL CALC-SCNC: 12 MMOL/L (ref 3–15)
AST SERPL W P-5'-P-CCNC: 16 U/L (ref 10–37)
AVG GLU, 10930: 116 MG/DL (ref 91–123)
BASOPHILS # BLD: 0.1 K/UL (ref 0–0.2)
BASOPHILS NFR BLD: 1 % (ref 0–2)
BILIRUB SERPL-MCNC: 0.2 MG/DL (ref 0.2–1.2)
BILIRUB UR QL: NEGATIVE
BUN SERPL-MCNC: 16 MG/DL (ref 6–22)
CALCIUM SERPL-MCNC: 9.7 MG/DL (ref 8.4–10.5)
CHLORIDE SERPL-SCNC: 102 MMOL/L (ref 98–110)
CHOLEST SERPL-MCNC: 223 MG/DL (ref 110–200)
CLARITY: CLEAR
CO2 SERPL-SCNC: 23 MMOL/L (ref 20–32)
COLOR UR: YELLOW
CREAT SERPL-MCNC: 0.8 MG/DL (ref 0.5–1.2)
EOSINOPHIL # BLD: 0.1 K/UL (ref 0–0.5)
EOSINOPHIL NFR BLD: 2 % (ref 0–6)
ERYTHROCYTE [DISTWIDTH] IN BLOOD BY AUTOMATED COUNT: 13.7 % (ref 10–15.5)
GFRAA, 66117: >60
GFRNA, 66118: >60
GLOBULIN,GLOB: 3.1 G/DL (ref 2–4)
GLUCOSE SERPL-MCNC: 112 MG/DL (ref 70–99)
GLUCOSE UR QL: NEGATIVE MG/DL
GRANULOCYTES,GRANS: 65 % (ref 40–75)
HBA1C MFR BLD HPLC: 5.7 % (ref 4.8–5.6)
HCT VFR BLD AUTO: 39.3 % (ref 35.1–48)
HDLC SERPL-MCNC: 3.7 MG/DL (ref 0–5)
HDLC SERPL-MCNC: 61 MG/DL
HGB BLD-MCNC: 11.9 G/DL (ref 11.7–16)
HGB UR QL STRIP: NEGATIVE
KETONES UR QL STRIP.AUTO: NEGATIVE MG/DL
LDL/HDL RATIO,LDHD: 2.2
LDLC SERPL CALC-MCNC: 134 MG/DL (ref 50–99)
LEUKOCYTE ESTERASE: NEGATIVE
LYMPHOCYTES, LYMLT: 24 % (ref 20–45)
MCH RBC QN AUTO: 28 PG (ref 26–34)
MCHC RBC AUTO-ENTMCNC: 30 G/DL (ref 31–36)
MCV RBC AUTO: 93 FL (ref 80–99)
MONOCYTES # BLD: 0.4 K/UL (ref 0.1–1)
MONOCYTES NFR BLD: 8 % (ref 3–12)
NEUTROPHILS # BLD AUTO: 3.7 K/UL (ref 1.8–7.7)
NITRITE UR QL STRIP.AUTO: NEGATIVE
NON-HDL CHOLESTEROL, 011976: 162 MG/DL
PH UR STRIP: 5 PH (ref 5–8)
PLATELET # BLD AUTO: 372 K/UL (ref 140–440)
PMV BLD AUTO: 12.3 FL (ref 9–13)
POTASSIUM SERPL-SCNC: 4.4 MMOL/L (ref 3.5–5.5)
PROT SERPL-MCNC: 7.3 G/DL (ref 6.4–8.3)
PROT UR QL STRIP: NEGATIVE MG/DL
RBC # BLD AUTO: 4.24 M/UL (ref 3.8–5.2)
SODIUM SERPL-SCNC: 137 MMOL/L (ref 133–145)
SP GR UR: 1.02 (ref 1–1.03)
T4 FREE SERPL-MCNC: 1 NG/DL (ref 0.9–1.8)
TRIGL SERPL-MCNC: 140 MG/DL (ref 40–149)
TSH SERPL DL<=0.005 MIU/L-ACNC: 2.31 MCU/ML (ref 0.27–4.2)
URINE ASCORBIC ACID: NEGATIVE MG/DL
UROBILINOGEN UR STRIP-MCNC: <2 MG/DL
VLDLC SERPL CALC-MCNC: 28 MG/DL (ref 8–30)
WBC # BLD AUTO: 5.7 K/UL (ref 4–11)

## 2022-04-04 ENCOUNTER — OFFICE VISIT (OUTPATIENT)
Dept: FAMILY MEDICINE CLINIC | Age: 47
End: 2022-04-04
Payer: MEDICAID

## 2022-04-04 VITALS
OXYGEN SATURATION: 98 % | BODY MASS INDEX: 38.23 KG/M2 | DIASTOLIC BLOOD PRESSURE: 84 MMHG | SYSTOLIC BLOOD PRESSURE: 133 MMHG | TEMPERATURE: 97.2 F | WEIGHT: 209 LBS | HEART RATE: 84 BPM | RESPIRATION RATE: 20 BRPM

## 2022-04-04 DIAGNOSIS — Z71.3 WEIGHT LOSS COUNSELING, ENCOUNTER FOR: ICD-10-CM

## 2022-04-04 DIAGNOSIS — L65.9 HAIR LOSS: ICD-10-CM

## 2022-04-04 DIAGNOSIS — R91.1 LUNG NODULE: ICD-10-CM

## 2022-04-04 DIAGNOSIS — U09.9 COVID-19 LONG HAULER: ICD-10-CM

## 2022-04-04 DIAGNOSIS — Z76.89 ENCOUNTER TO ESTABLISH CARE: Primary | ICD-10-CM

## 2022-04-04 DIAGNOSIS — Z12.31 ENCOUNTER FOR SCREENING MAMMOGRAM FOR MALIGNANT NEOPLASM OF BREAST: ICD-10-CM

## 2022-04-04 DIAGNOSIS — E55.9 VITAMIN D DEFICIENCY: ICD-10-CM

## 2022-04-04 DIAGNOSIS — R73.03 PREDIABETES: ICD-10-CM

## 2022-04-04 DIAGNOSIS — E78.2 MIXED HYPERLIPIDEMIA: ICD-10-CM

## 2022-04-04 DIAGNOSIS — Z87.01 HISTORY OF PNEUMONIA: ICD-10-CM

## 2022-04-04 DIAGNOSIS — R06.02 SHORTNESS OF BREATH: ICD-10-CM

## 2022-04-04 PROCEDURE — 99215 OFFICE O/P EST HI 40 MIN: CPT | Performed by: NURSE PRACTITIONER

## 2022-04-04 RX ORDER — PRAVASTATIN SODIUM 20 MG/1
20 TABLET ORAL
Qty: 90 TABLET | Refills: 3 | Status: SHIPPED | OUTPATIENT
Start: 2022-04-04 | End: 2022-05-12 | Stop reason: SDUPTHER

## 2022-04-04 RX ORDER — CHOLECALCIFEROL (VITAMIN D3) 125 MCG
5000 CAPSULE ORAL DAILY
Qty: 90 CAPSULE | Refills: 5
Start: 2022-04-04 | End: 2022-05-12 | Stop reason: SDUPTHER

## 2022-04-04 NOTE — PATIENT INSTRUCTIONS
Insulin Resistance: Care Instructions  Your Care Instructions     Insulin resistance means that the body can't use insulin as it should. Insulin lets sugar (glucose) enter the body's cells, where it is used for energy. It also helps muscles, fat, and liver cells store sugar to be released when needed. If the body tissues don't respond to insulin right, the blood sugar level rises. Insulin resistance mainly is caused by obesity. But other medical conditions, such as acromegaly and Cushing's syndrome, also can cause it. It can run in families too. Follow-up care is a key part of your treatment and safety. Be sure to make and go to all appointments, and call your doctor if you are having problems. It's also a good idea to know your test results and keep a list of the medicines you take. How can you care for yourself at home? · Take your medicines exactly as prescribed. Call your doctor if you think you are having a problem with your medicine. You will get more details on the specific medicines your doctor prescribes. · Make healthy food choices. · Get at least 30 minutes of exercise on most days of the week. Exercise helps control your blood sugar. It also helps you stay at a healthy weight. Walking is a good choice. You also may want to do other activities, such as running, swimming, cycling, or playing tennis or team sports. · Try to lose weight. Losing even a small amount of weight can help. · Do not smoke. If you need help quitting, talk to your doctor about stop-smoking programs and medicines. These can increase your chances of quitting for good. When should you call for help? Watch closely for changes in your health, and be sure to contact your doctor if:    · Your blood sugar stays outside the level your doctor set for you.     · You have any problems. Where can you learn more?   Go to http://www.gray.com/  Enter V537 in the search box to learn more about \"Insulin Resistance: Care Instructions. \"  Current as of: July 28, 2021               Content Version: 13.2  © 2006-2022 [employer]. Care instructions adapted under license by [employer] (which disclaims liability or warranty for this information). If you have questions about a medical condition or this instruction, always ask your healthcare professional. [address] any warranty or liability for your use of this information. Prediabetes: Care Instructions  Overview     Prediabetes is a warning sign that you're at risk for getting type 2 diabetes. It means that your blood sugar is higher than it should be. But it's not high enough to be diabetes. The food you eat naturally turns into sugar. Your body uses the sugar for energy. Normally, an organ called the pancreas makes insulin. And insulin allows the sugar in your blood to get into your body's cells. But sometimes the body can't use insulin the right way. So the sugar stays in your blood instead. This is called insulin resistance. The buildup of sugar in your blood means you have prediabetes. The good news is that you may be able to prevent or delay diabetes. Making small lifestyle changes, like getting active and changing your eating habits, may help you get your blood sugar back to normal. You can work with your doctor to make a treatment plan. Follow-up care is a key part of your treatment and safety. Be sure to make and go to all appointments, and call your doctor if you are having problems. It's also a good idea to know your test results and keep a list of the medicines you take. How can you care for yourself at home? · Watch your weight. A healthy weight helps your body use insulin properly. · Limit the amount of calories, sweets, and unhealthy fat you eat. Ask your doctor if you should see a dietitian. A registered dietitian can help you create meal plans that fit your lifestyle.   · Get at least 27 minutes of exercise on most days of the week. Exercise helps control your blood sugar. It also helps you maintain a healthy weight. Walking is a good choice. You also may want to do other activities, such as running, swimming, cycling, or playing tennis or team sports. · Do not smoke. Smoking can make prediabetes worse. If you need help quitting, talk to your doctor about stop-smoking programs and medicines. These can increase your chances of quitting for good. · If your doctor prescribed medicines, take them exactly as prescribed. Call your doctor if you think you are having a problem with your medicine. You will get more details on the specific medicines your doctor prescribes. When should you call for help? Watch closely for changes in your health, and be sure to contact your doctor if:    · You have any symptoms of diabetes. These may include:  ? Being thirsty more often. ? Urinating more. ? Being hungrier. ? Losing weight. ? Being very tired. ? Having blurry vision.     · You have a wound that will not heal.     · You have an infection that will not go away.     · You have problems with your blood pressure.     · You want more information about diabetes and how you can keep from getting it. Where can you learn more? Go to http://www.gray.com/  Enter I222 in the search box to learn more about \"Prediabetes: Care Instructions. \"  Current as of: July 28, 2021               Content Version: 13.2  © 3829-6170 [employer]wise[employer]. Care instructions adapted under license by [employer] (which disclaims liability or warranty for this information). If you have questions about a medical condition or this instruction, always ask your healthcare professional. [address] any warranty or liability for your use of this information. Learning About Meal Planning for Diabetes  Why plan your meals?      Meal planning can be a key part of managing diabetes. Planning meals and snacks with the right balance of carbohydrate, protein, and fat can help you keep your blood sugar at the target level you set with your doctor. You don't have to eat special foods. You can eat what your family eats, including sweets once in a while. But you do have to pay attention to how often you eat and how much you eat of certain foods. You may want to work with a dietitian or a diabetes educator. They can give you tips and meal ideas and can answer your questions about meal planning. This health professional can also help you reach a healthy weight if that is one of your goals. What plan is right for you? Your dietitian or diabetes educator may suggest that you start with the plate format or carbohydrate counting. The plate format  The plate format is a simple way to help you manage how you eat. You plan meals by learning how much space each food should take on a plate. Using the plate format helps you manage the amount of carbohydrate you eat. It can make it easier to keep your blood sugar level within your target range. It also helps you see if you're eating healthy portion sizes. To use the plate format, you put non-starchy vegetables on half your plate. Add lean protein foods, such as fish, lean meats and poultry, or soy products, on one-quarter of the plate. Put a grain or starchy vegetable (such as brown rice or a potato) on the final quarter of the plate. You can add a small piece of fruit and some low-fat or fat-free milk or yogurt, depending on your carbohydrate goal for each meal.  Here are some tips for using the plate format:  · Make sure that you are not using an oversized plate. A 9-inch plate is best. Many restaurants use larger plates. · Get used to using the plate format at home. Then you can use it when you eat out. · Write down your questions about using the plate format.  Talk to your doctor, a dietitian, or a diabetes educator about your concerns. Carbohydrate counting  With carbohydrate counting, you plan meals based on the amount of carbohydrate in each food. Carbohydrate raises blood sugar higher and more quickly than any other nutrient. It is found in desserts, breads and cereals, and fruit. It's also found in starchy vegetables such as potatoes and corn, grains such as rice and pasta, and milk and yogurt. You can help keep your blood sugar levels within your target range by planning how much carbohydrate to have at meals and snacks. The amount you need depends on several things. These include your weight, how active you are, which diabetes medicines you take, and what your goals are for your blood sugar levels. A registered dietitian or diabetes educator can help you plan how much carbohydrate to include in each meal and snack. An example of a carbohydrate counting plan is:  · 45 to 60 grams at each meal. That's about the same as 3 to 4 carbohydrate servings. · 15 to 20 grams at each snack. That's about the same as 1 carbohydrate serving. The Nutrition Facts label on packaged foods tells you how much carbohydrate is in a serving of the food. First, look at the serving size on the food label. Is that the amount you eat in a serving? All of the nutrition information on a food label is based on that serving size. So if you eat more or less than that, you'll need to adjust the other numbers. Total carbohydrate is the next thing you need to look for on the label. If you count carbohydrate servings, one serving of carbohydrate is 15 grams. For foods that don't come with labels, such as fresh fruits and vegetables, you'll need a guide that lists carbohydrate in these foods. Ask your doctor, dietitian, or diabetes educator about books or other nutrition guides you can use. If you take insulin, you need to know how many grams of carbohydrate are in a meal. This lets you know how much rapid-acting insulin to take before you eat.  If you use an insulin pump, you get a constant rate of insulin during the day. So the pump must be programmed at meals to give you extra insulin to cover the rise in blood sugar after meals. When you know how much carbohydrate you will eat, you can take the right amount of insulin. Or, if you always use the same amount of insulin, you need to make sure that you eat the same amount of carbohydrate at meals. If you need more help to understand carbohydrate counting and food labels, ask your doctor, dietitian, or diabetes educator. How can you plan healthy meals? Here are some tips to get started:  · Plan your meals a week at a time. Don't forget to include snacks too. · Use cookbooks or online recipes to plan several main meals. Plan some quick meals for busy nights. You also can double some recipes that freeze well. Then you can save half for other busy nights when you don't have time to cook. · Make sure you have the ingredients you need for your recipes. If you're running low on basic items, put these items on your shopping list too. · List foods that you use to make breakfasts, lunches, and snacks. List plenty of fruits and vegetables. · Post this list on the refrigerator. Add to it as you think of more things you need. · Take the list to the store to do your weekly shopping. Follow-up care is a key part of your treatment and safety. Be sure to make and go to all appointments, and call your doctor if you are having problems. It's also a good idea to know your test results and keep a list of the medicines you take. Where can you learn more? Go to http://www.gray.com/  Enter C957 in the search box to learn more about \"Learning About Meal Planning for Diabetes. \"  Current as of: September 8, 2021               Content Version: 13.2  © 2367-2049 Healthwise, Incorporated.    Care instructions adapted under license by [employer] (which disclaims liability or warranty for this information). If you have questions about a medical condition or this instruction, always ask your healthcare professional. [place] [address] any warranty or liability for your use of this information.       Dr. Jem Hagan diabetes, refined carbohydrates and obesity

## 2022-04-04 NOTE — PROGRESS NOTES
OFFICE NOTE    Philip Nolasco is a 41 y.o. female presenting today for office visit. Patient Active Problem List   Diagnosis Code    Prediabetes R73.03    Lung nodule R91.1    Anxiety F41.9    Slow transit constipation K59.01    Episodic lightheadedness R42    Vitamin D deficiency E55.9    DEE (nonalcoholic steatohepatitis) K75.81    Vitamin B12 deficiency E53.8    Fibromyalgia M79.7    Chronic cervical pain M54.2, G89.29     4/4/2022  3:37 PM    Chief Complaint   Patient presents with    Establish Care     HPI:   Previous Dr. Lyons Sera patient and establishing care with new provider. Patient c/o hair loss 2 weeks ago. Her TSH and T4 are normal.     Prediabetes A1C 5.7% and has a history of prediabetes. Patient is interested in weight loss. Patient agreed to referral to nutrition. Patient sees pain management related to fibromyalgia, Dr. Haim Moya. Patient has had blood work with them. She goes 4/27/2022. She is seeing Dr. Olan Collet and NP K. Emiliano Fothergill with cardiology. She just had an echo. She has had heart surgery in the past. She has high heart rate at times. 1/9/2022 tested for covid and has been SOB ever since. She use to see pulmonary every year for a \"lung nodule\" at [facility].     She had a colonoscopy for diverticulitis in 2020. Patients last menstrual period was 3/22/2022. She had spotting recently. She sees Dr. Courtney Angeles for GYN. Smokes no and drinks alcohol no. Patient reports appetite is good, no urinary issues, sleeps well and regular bowel movements. Patient denies fever, chills, chest pain, shortness of breath, abdomen pain or dark tarry stool.     ROS:    · General: negative for - chills, fever, weight changes or malaise  · HEENT: no sore throat, nasal congestion, vision problems or ear problems  · Respiratory: no cough, shortness of breath, or wheezing  · Cardiovascular: no chest pain, palpitations, or dyspnea on exertion  · Gastrointestinal: no abdominal pain, N/V, change in bowel habits, or black or bloody stools  · Musculoskeletal: no back pain, joint pain, joint stiffness, muscle pain or muscle weakness  · Neurological: no numbness, tingling, headache or dizziness  · Endo:  No polyuria or polydipsia. · : no hematuria, dysuria, frequency, hesitancy, or nocturia. · Skin: No itching or rash, no open skin, no unusual lesions   · Psychological: negative for - anxiety, depression, sleep disturbances, suicidal or homicidal ideations     PHQ Screening   3 most recent PHQ Screens 4/4/2022   Little interest or pleasure in doing things Not at all   Feeling down, depressed, irritable, or hopeless Not at all   Total Score PHQ 2 0       History  Past Medical History:   Diagnosis Date    Anxiety     Lung nodule     PFO (patent foramen ovale)     s/p closure    Prediabetes        Past Surgical History:   Procedure Laterality Date    HX OTHER SURGICAL      Closure of patent foramen ovale       Social History     Socioeconomic History    Marital status: LEGALLY      Spouse name: Not on file    Number of children: Not on file    Years of education: Not on file    Highest education level: Not on file   Occupational History    Not on file   Tobacco Use    Smoking status: Never Smoker    Smokeless tobacco: Never Used   Vaping Use    Vaping Use: Never used   Substance and Sexual Activity    Alcohol use: No    Drug use: No    Sexual activity: Not Currently   Other Topics Concern    Not on file   Social History Narrative    Not on file     Social Determinants of Health     Financial Resource Strain:     Difficulty of Paying Living Expenses: Not on file   Food Insecurity:     Worried About Running Out of Food in the Last Year: Not on file    [place] of Food in the Last Year: Not on file   Transportation Needs:     Lack of Transportation (Medical): Not on file    Lack of Transportation (Non-Medical):  Not on file   Physical Activity:     Days of Exercise per Week: Not on file    Minutes of Exercise per Session: Not on file   Stress:     Feeling of Stress : Not on file   Social Connections:     Frequency of Communication with Friends and Family: Not on file    Frequency of Social Gatherings with Friends and Family: Not on file    Attends Sikh Services: Not on file    Active Member of [address] [place] [address] or Organizations: Not on file    Attends Club or Organization Meetings: Not on file    Marital Status: Not on file   Intimate Partner Violence:     Fear of Current or Ex-Partner: Not on file    Emotionally Abused: Not on file    Physically Abused: Not on file    Sexually Abused: Not on file   Housing Stability:     Unable to Pay for Housing in the Last Year: Not on file    Number of [place] in the Last Year: Not on file    Unstable Housing in the Last Year: Not on file       Allergies   Allergen Reactions    Morphine Anaphylaxis       Current Outpatient Medications   Medication Sig Dispense Refill    cholecalciferol (Dialyvite Vitamin D) (5000 Units /125 mcg) capsule Take 1 Capsule by mouth daily. For Vitamin D Deficiency  Indications: low vitamin D levels 90 Capsule 5    pravastatin (PRAVACHOL) 20 mg tablet Take 1 Tablet by mouth nightly. Indications: stroke prevention 90 Tablet 3    ibuprofen (MOTRIN) 800 mg tablet Take 1 Tablet by mouth every eight (8) hours as needed for Pain. 30 Tablet 0    phenol throat spray (CHLORASEPTIC) 1.4 % spray Take 1 Spray by mouth as needed for Sore throat. 177 mL 0    cyclobenzaprine (FLEXERIL) 10 mg tablet Take 1 Tablet by mouth three (3) times daily as needed (pain). 40 Tablet 0    omeprazole (PRILOSEC) 20 mg capsule Take 1 Capsule by mouth daily. 90 Capsule 0    cyanocobalamin 1,000 mcg tablet Take 1 Tablet by mouth daily. 90 Tablet 3    aspirin delayed-release 81 mg tablet Take 1 Tablet by mouth daily.  (Patient not taking: Reported on 11/8/2021) 90 Tablet 3       Patient Care Team:  Patient Care Team:  Jaime Rolon NP as PCP - General (Nurse Practitioner)  Jaime Rolon NP as PCP - [facility] Empaneled Provider  Louie Paredes MD (Gastroenterology)  Bob Peguero MD (Neurology)    LABS:  A1c 5.7%, prediabetes. Total cholesterol high at 223, LDL high at 134 and good cholesterol HDL heart protective at 61. Metabolic panel unremarkable. TSH and T4 within normal limits. Vitamin D was low. CBC unremarkable. Urinalysis was normal.    Physical Exam  Patient appears well, she is pleasant, alert, oriented x 3, in no distress. Obese. ENT normal.  Neck supple. No adenopathy or thyromegaly. EDWINA. Lungs are clear, good air entry, no wheezes, rhonchi or rales. Cardiovascular, S1 and S2 normal, no murmurs, regular rate and rhythm. No LAD. Chest wall negative for tenderness  Abdomen is soft without tenderness, guarding  /Anorectal, deferred. Muscleskeletal, no swelling, no tenderness, no injury. Extremities show no edema  Neurological is normal without focal findings. Skin: no concerning lesions. Psych: normal affect. Mood good. Oriented x 3. Judgement and insight intact. Vitals:    04/04/22 1548   BP: 133/84   Pulse: 84   Resp: 20   Temp: 97.2 °F (36.2 °C)   SpO2: 98%   Weight: 209 lb (94.8 kg)   PainSc:   0 - No pain   LMP: 03/22/2022     Assessment and Plan    Diagnoses and all orders for this visit:    Encounter to establish care    COVID-19 long hauler  -     REFERRAL TO PULMONARY DISEASE    Shortness of breath  -     REFERRAL TO PULMONARY DISEASE    Lung nodule  -     REFERRAL TO PULMONARY DISEASE    History of pneumonia  -     REFERRAL TO PULMONARY DISEASE    Vitamin D deficiency  -     cholecalciferol (Dialyvite Vitamin D) (5000 Units /125 mcg) capsule; Take 1 Capsule by mouth daily.  For Vitamin D Deficiency  Indications: low vitamin D levels, No Print, Disp-90 Capsule, R-5    Hair loss  -     REFERRAL TO DERMATOLOGY    Mixed hyperlipidemia  -     pravastatin (PRAVACHOL) 20 mg tablet; Take 1 Tablet by mouth nightly. Indications: stroke prevention, Normal, Disp-90 Tablet, R-3    Encounter for screening mammogram for malignant neoplasm of breast  -     [facility] 3D MARY W MAMMO BI SCREENING INCL CAD; Future    Weight loss counseling, encounter for  -     REFERRAL TO NUTRITION    Prediabetes  -     REFERRAL TO NUTRITION         *Plan of care reviewed with patient. Patient in agreement with plan and expresses understanding. All questions answered and patient encouraged to call or RTO if further questions or concerns. 50% of 39 minutes spent on counseling and managing her care. Follow-up and Dispositions    · Return in about 1 month (around 5/4/2022).        KARON PanchalC

## 2022-04-04 NOTE — PROGRESS NOTES
Cecilia Plan presents today for   Chief Complaint   Patient presents with   [facility].       Is someone accompanying this pt? no    Is the patient using any DME equipment during OV? no    Depression Screening:  3 most recent PHQ Screens 4/4/2022   Little interest or pleasure in doing things Not at all   Feeling down, depressed, irritable, or hopeless Not at all   Total Score PHQ 2 0       Learning Assessment:  Learning Assessment 12/11/2018   PRIMARY LEARNER Patient   HIGHEST LEVEL OF EDUCATION - PRIMARY LEARNER  GRADUATED HIGH SCHOOL OR GED   BARRIERS PRIMARY LEARNER NONE   CO-LEARNER CAREGIVER No   PRIMARY LANGUAGE ENGLISH   LEARNER PREFERENCE PRIMARY LISTENING   ANSWERED BY patient   RELATIONSHIP SELF       Abuse Screening:  Abuse Screening Questionnaire 3/23/2020   Do you ever feel afraid of your partner? N   Are you in a relationship with someone who physically or mentally threatens you? N   Is it safe for you to go home? Y       Fall Risk  No flowsheet data found. Health Maintenance reviewed and discussed and ordered per Provider. Health Maintenance Due   Topic Date Due    Hepatitis C Screening  Never done    COVID-19 Vaccine (1) Never done    DTaP/Tdap/Td series (1 - Tdap) Never done    Colorectal Cancer Screening Combo  03/18/2022   . Coordination of Care:  1. Have you been to the ER, urgent care clinic since your last visit? Hospitalized since your last visit? no    2. Have you seen or consulted any other health care providers outside of the [address] [place] [address] since your last visit? Include any pap smears or colon screening.  no      Last  Checked na  Last UDS Checked na  Last Pain contract signed: na

## 2022-04-19 ENCOUNTER — HOSPITAL ENCOUNTER (OUTPATIENT)
Dept: WOMENS IMAGING | Age: 47
Discharge: HOME | End: 2022-04-19
Attending: NURSE PRACTITIONER
Payer: MEDICAID

## 2022-04-19 DIAGNOSIS — Z12.31 ENCOUNTER FOR SCREENING MAMMOGRAM FOR MALIGNANT NEOPLASM OF BREAST: ICD-10-CM

## 2022-04-19 PROCEDURE — 77063 BREAST TOMOSYNTHESIS BI: CPT

## 2022-05-12 DIAGNOSIS — E55.9 VITAMIN D DEFICIENCY: ICD-10-CM

## 2022-05-12 DIAGNOSIS — R53.82 CHRONIC FATIGUE: ICD-10-CM

## 2022-05-12 DIAGNOSIS — E78.2 MIXED HYPERLIPIDEMIA: ICD-10-CM

## 2022-05-12 DIAGNOSIS — M54.2 CHRONIC CERVICAL PAIN: ICD-10-CM

## 2022-05-12 DIAGNOSIS — G89.29 CHRONIC CERVICAL PAIN: ICD-10-CM

## 2022-05-12 DIAGNOSIS — K21.9 CHRONIC GERD: ICD-10-CM
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
## 2022-05-13 RX ORDER — LANOLIN ALCOHOL/MO/W.PET/CERES
1000 CREAM (GRAM) TOPICAL DAILY
Qty: 90 TABLET | Refills: 3 | Status: SHIPPED | OUTPATIENT
Start: 2022-05-13 | End: 2022-09-09 | Stop reason: SDUPTHER

## 2022-05-13 RX ORDER — IBUPROFEN 800 MG/1
800 TABLET ORAL
Qty: 30 TABLET | Refills: 0 | Status: SHIPPED | OUTPATIENT
Start: 2022-05-13 | End: 2022-09-09 | Stop reason: SDUPTHER

## 2022-05-13 RX ORDER — CHOLECALCIFEROL (VITAMIN D3) 125 MCG
5000 CAPSULE ORAL DAILY
Qty: 90 CAPSULE | Refills: 5 | Status: SHIPPED | OUTPATIENT
Start: 2022-05-13 | End: 2022-06-01 | Stop reason: ALTCHOICE

## 2022-05-13 RX ORDER — CYCLOBENZAPRINE HCL 10 MG
10 TABLET ORAL
Qty: 40 TABLET | Refills: 0 | Status: SHIPPED | OUTPATIENT
Start: 2022-05-13 | End: 2022-09-09 | Stop reason: SDUPTHER

## 2022-05-13 RX ORDER — PRAVASTATIN SODIUM 20 MG/1
20 TABLET ORAL
Qty: 90 TABLET | Refills: 3 | Status: SHIPPED | OUTPATIENT
Start: 2022-05-13 | End: 2022-09-09 | Stop reason: SDUPTHER

## 2022-05-13 RX ORDER — OMEPRAZOLE 20 MG/1
20 CAPSULE, DELAYED RELEASE ORAL DAILY
Qty: 90 CAPSULE | Refills: 0 | Status: SHIPPED | OUTPATIENT
Start: 2022-05-13 | End: 2022-09-09 | Stop reason: SDUPTHER

## 2022-05-26 ENCOUNTER — VIRTUAL VISIT (OUTPATIENT)
Dept: FAMILY MEDICINE CLINIC | Age: 47
End: 2022-05-26
Payer: MEDICAID

## 2022-05-26 DIAGNOSIS — F43.10 PTSD (POST-TRAUMATIC STRESS DISORDER): ICD-10-CM

## 2022-05-26 DIAGNOSIS — F41.9 ANXIETY: Primary | ICD-10-CM

## 2022-05-26 PROCEDURE — 99213 OFFICE O/P EST LOW 20 MIN: CPT | Performed by: NURSE PRACTITIONER

## 2022-05-26 RX ORDER — ESCITALOPRAM OXALATE 5 MG/1
5 TABLET ORAL DAILY
Qty: 30 TABLET | Refills: 1 | Status: SHIPPED | OUTPATIENT
Start: 2022-05-26 | End: 2022-06-01 | Stop reason: SINTOL
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
## 2022-06-01 DIAGNOSIS — F41.9 ANXIETY: Primary | ICD-10-CM

## 2022-06-01 DIAGNOSIS — E55.9 VITAMIN D DEFICIENCY: ICD-10-CM

## 2022-06-01 RX ORDER — SERTRALINE HYDROCHLORIDE 50 MG/1
50 TABLET, FILM COATED ORAL DAILY
Qty: 30 TABLET | Refills: 0 | Status: SHIPPED | OUTPATIENT
Start: 2022-06-01 | End: 2022-06-30 | Stop reason: SDUPTHER

## 2022-06-01 RX ORDER — MELATONIN
2000 DAILY
Qty: 180 TABLET | Refills: 1 | Status: SHIPPED | OUTPATIENT
Start: 2022-06-01

## 2022-06-29 DIAGNOSIS — B37.31 VAGINAL YEAST INFECTION: Primary | ICD-10-CM

## 2022-06-29 RX ORDER — FLUCONAZOLE 150 MG/1
150 TABLET ORAL DAILY
Qty: 1 TABLET | Refills: 0 | Status: SHIPPED | OUTPATIENT
Start: 2022-06-29 | End: 2022-06-30

## 2022-06-30 DIAGNOSIS — F41.9 ANXIETY: ICD-10-CM

## 2022-06-30 RX ORDER — SERTRALINE HYDROCHLORIDE 50 MG/1
50 TABLET, FILM COATED ORAL DAILY
Qty: 30 TABLET | Refills: 0 | Status: SHIPPED | OUTPATIENT
Start: 2022-06-30 | End: 2022-07-26 | Stop reason: SDUPTHER

## 2022-07-01 ENCOUNTER — VIRTUAL VISIT (OUTPATIENT)
Dept: FAMILY MEDICINE CLINIC | Age: 47
End: 2022-07-01

## 2022-07-01 ENCOUNTER — DOCUMENTATION ONLY (OUTPATIENT)
Dept: FAMILY MEDICINE CLINIC | Age: 47
End: 2022-07-01

## 2022-07-01 ENCOUNTER — TELEPHONE (OUTPATIENT)
Dept: FAMILY MEDICINE CLINIC | Age: 47
End: 2022-07-01

## 2022-07-01 DIAGNOSIS — E78.2 MIXED HYPERLIPIDEMIA: Primary | ICD-10-CM

## 2022-07-01 NOTE — PROGRESS NOTES
Spoke with patient by phone. She was driving to the [facility] where she is moving. Asked patient to reschedule face-to-face. We had a virtual visit today but we should not be speaking by phone while she is driving.

## 2022-07-19 ENCOUNTER — HOSPITAL ENCOUNTER (OUTPATIENT)
Dept: NUTRITION | Age: 47
Discharge: HOME | End: 2022-07-19
Payer: MEDICAID

## 2022-07-19 PROCEDURE — 97802 MEDICAL NUTRITION INDIV IN: CPT

## 2022-07-19 NOTE — PROGRESS NOTES
..  [employer] [facility] - Outpatient Nutrition Services  [address], [place]   Nutrition Assessment - Medical Nutrition Therapy   Outpatient Initial Evaluation         Patient Name: Kim Dempsey : 1975   Treatment Diagnosis: Obesity, pre-diabetes   Referral Source: Alesia Pickering NP Start of Care [facility]): 2022     Gender: female Age: 41 y.o. Ht: 62 in Wt: 198  lb  kg   BMI: 36.2 BMR   Male  BMR Female [id number]     Past Medical History:  Anxiety, constipation, PTSD, DEE, fibromyalgia, stroke, high cholesterol      Pertinent Medications:     Pravastatin, Vit D, Flexeril, Lexapro   Biochemical Data:   Lab Results   Component Value Date/Time    Hemoglobin A1c 5.7 (H) 2022 02:06 PM     Lab Results   Component Value Date/Time    Sodium 137 2022 02:06 PM    Potassium 4.4 2022 02:06 PM    Chloride 102 2022 02:06 PM    CO2 23 2022 02:06 PM    Anion gap 12.0 2022 02:06 PM    Glucose 112 (H) 2022 02:06 PM    BUN 16 2022 02:06 PM    Creatinine 0.8 2022 02:06 PM    Calcium 9.7 2022 02:06 PM    Bilirubin, total 0.2 2022 02:06 PM    Alk. phosphatase 72 2022 02:06 PM    Protein, total 7.3 2022 02:06 PM    Albumin 4.2 2022 02:06 PM    Globulin 3.1 2022 02:06 PM    A-G Ratio 1.4 2022 02:06 PM    ALT (SGPT) 15 2022 02:06 PM    AST (SGOT) 16 2022 02:06 PM     Lab Results   Component Value Date/Time    Cholesterol, total 223 (H) 2022 02:06 PM    HDL Cholesterol 61 2022 02:06 PM    LDL, calculated 134 (H) 2022 02:06 PM    VLDL, calculated 28 2022 02:06 PM    Triglyceride 140 2022 02:06 PM     Lab Results   Component Value Date/Time    ALT (SGPT) 15 2022 02:06 PM    AST (SGOT) 16 2022 02:06 PM    Alk.  phosphatase 72 2022 02:06 PM    Bilirubin, total 0.2 2022 02:06 PM     Lab Results   Component Value Date/Time    Creatinine 0.8 03/31/2022 02:06 PM     Lab Results   Component Value Date/Time    BUN 16 03/31/2022 02:06 PM     No results found for: MCACR, MCA1, MCA2, MCA3, MCAU, MCAU2, MCALPOCT     Assessment:   The patient is here for weight loss counseling and help with pre-diabetes. She moved to [address] two weeks ago but she works in [place]. She would like to return to our [facility]. location for follow up. She said she is taking her diagnosis seriously. She has an 6year old son at home. \"I don't want to die. \" She says she is trying to eat healthier. The patient reports that she does not exercise due to her \"heart racing. \" She says she has had constipation all of her life and does not have regular bowel habits. She has lost approx 10# since  her diagnosis by diet alone. Food & Nutrition: Breakfast: (9 AM): steel cut oatmeal OR plant based burrito OR lately Honey Nut Cheerios and sweetened almond milk   Lunch (1:15): Clean Eats or Weight Watchers meal (tries to stick to 20 carbs or less)  Dinner (8 PM): \"It varies\" Another Clean Eats meal OR salad with spinach, eggs or southwestern chicken salad. She buys the kits so the dressing is whatever comes with it. If she has a salad she will have watermelon. OR meat, broccoli or string beans, cauliflower mashed potatoes OR cauliflower pizza OR BBQ chicken and corn   Snacks: Veggie Straws or nuts  Impression: Restricting carbs too much leads to overeating and this is not sustainable long term. Not eating enough fiber (found in fruits, vegetables and whole grains), not portion controlling, needs a snack with carbs and protein in between lunch and dinner. Needs to eat dinner earlier. No exercise at all.        Estimate Needs   Calories: 1500  Protein: 94 Carbs: 169 Fat: 50   Kcal/day  g/day  g/day  g/day        percent: 25  45  30               Nutrition Diagnosis   Obesity related to excessive energy intake as evidenced by BMI of 36.2    Altered nutrition related lab values related to pre-DM, hypercholestolemia as evidenced by A1C 5.7%, chol 223, . (Patient recently started taking a statin medication.)         Nutrition Intervention &  Education: Educated patient on pre-diabetes, weight loss diet with plate method guidelines. Encouraged the patient to eat at least 3 times per day, spacing meals no more than 4-5 hours apart (2-3 hours in-between snacks). Discussed that 1/2 the plate should include non-starchy vegetables, 1/4 plate should be lean protein, and 1/4 of plate should be carbohydrate. Provided the patient with list of macro-nutrient sources (CHO, pro, and fat) and appropriate amounts of CHO to be consumed at each meal and snack. Stick to 45 g carbs per meal, 15-20 g carbs per snack. (Do not skip or skimp on carbs at meals- leads to overeating and sweet cravings.) Encouraged the patient to try using measuring cups (measure out 1 cup) to familiarize with appropriate serving sizes. Suggested the patient include protein source with all meals and snacks. Include more non-starchy vegetables and 2 fruit servings per day (eat a variety). Don't drink calories: avoid fruit juice, regular sodas, sweet tea, Gatorade. Eliminate/decrease fast food consumption. Taught patient how to read a food label. We discussed the importance of timing of meals. Discussed importance of 150 minutes per week physical activity. Could try L Plantarum probiotic for constipation.  Take with full glass of water before eating breakfast.      Handouts Provided: [x]  Carbohydrates  [x]  Protein  [x]  Non-starchy Vegatbles  [x]  Food Label  [x]  Meal and Snack Ideas  []  Food Journals [x]  Diabetes  [x]  Cholesterol  [x]  Sodium  [x]  Gen Nutr Guidelines  []  SBGM Guidelines  [x]  Others: My Healthy Plate, Sugar Shockers, Meal Plan    Information Reviewed with: Patient   Readiness to Change Stage: []  Pre-contemplative    []  Contemplative  []  Preparation               [x]  Action                  [] Maintenance   Potential Barriers to Learning: []  Decline in memory    []  Language barrier   []  Other:  []  Emotional                  []  Limited mobility  []  Lack of motivation     [] Vision, hearing or cognitive impairment   Expected Compliance: Good- the patient expressed willingness to make some changes. She has already lost weight. She needs to be consistent  with diet and exercise to see long-term results. Nutritional Goal - To promote lifestyle changes to result in:    [x]  Weight loss  [x]  Improved diabetic control  [x]  Decreased cholesterol levels  []  Decreased blood pressure  []  Weight maintenance []  Preventing any interactions associated with food allergies  []  Adequate weight gain toward goal weight  []  Other:        Patient Goals: The patient will follow meal plan (plate method including 1/2 plate as non-starchy vegetables, 1/4 plate as lean protein, 1/4 plate as CHO choice) or 30-45 g CHO at meals and 15-20 g CHO at snacks) 80% of the time. - The patient will walk/ exercise 30 minutes per day at least 3 days per week with goal of 150 minutes per week. Try short walks after meals. - The patient will include protein source and CHO source with all meals and snacks 80% of the time. - The patient will not eat snacks after last meal of the day at least 5 out of 7 days per week. - The patient will focus on timing of meals/snacks not to exceed going longer than 4-5 hours in between meals ( 2-3 hours in between snacks) at least 5 out of 7 days per week. - The patient will eliminate all sugary beverages from diet (no juice, soda, sweet tea, coffee drinks, fruit juice) 90% of the time. - The patient will eat no more than 2 servings of fruit per day 100% of the time. - The patient will limit fast food to no more than 1-2 times per week every week. - Portion control carbs (use measuring cups). Measure out 1 cup and that is it for the meal. Don't skip carbs or you will be hungry later. Matilda Johnston RD, [facility]  Follow-up: The patient will call for a follow up appointment at [facility]. when she is ready. She wants to try this plan for a little while before coming back. Encouraged the patient to come back in 1 month.  Time: 3:37 PM

## 2022-08-23 ENCOUNTER — HOSPITAL ENCOUNTER (OUTPATIENT)
Dept: NUTRITION | Age: 47
End: 2022-08-23

## 2022-09-08 ENCOUNTER — OFFICE VISIT (OUTPATIENT)
Dept: FAMILY MEDICINE CLINIC | Age: 47
End: 2022-09-08
Payer: MEDICAID

## 2022-09-08 VITALS
TEMPERATURE: 97.7 F | HEART RATE: 72 BPM | WEIGHT: 199 LBS | HEIGHT: 62 IN | RESPIRATION RATE: 16 BRPM | OXYGEN SATURATION: 99 % | SYSTOLIC BLOOD PRESSURE: 120 MMHG | BODY MASS INDEX: 36.62 KG/M2 | DIASTOLIC BLOOD PRESSURE: 80 MMHG

## 2022-09-08 DIAGNOSIS — R73.03 PREDIABETES: ICD-10-CM

## 2022-09-08 DIAGNOSIS — Z01.818 PRE-OP EXAM: ICD-10-CM

## 2022-09-08 DIAGNOSIS — H26.9 CATARACT OF BOTH EYES, UNSPECIFIED CATARACT TYPE: ICD-10-CM

## 2022-09-08 DIAGNOSIS — E55.9 VITAMIN D DEFICIENCY: ICD-10-CM

## 2022-09-08 DIAGNOSIS — D64.9 ANEMIA, UNSPECIFIED TYPE: Primary | ICD-10-CM

## 2022-09-08 PROCEDURE — 99214 OFFICE O/P EST MOD 30 MIN: CPT | Performed by: NURSE PRACTITIONER

## 2022-09-08 NOTE — PROGRESS NOTES
Amrik Nance is a 41 y.o. female  established patient, here for evaluation of the following chief complaint(s):  Chief Complaint   Patient presents with    Pre-op Exam        Assessment and Plan  1. Anemia, unspecified type  -     CBC WITH AUTOMATED DIFF; Future  -     VITAMIN B12 & FOLATE; Future  -     IRON PROFILE; Future  -     FERRITIN; Future  2. Vitamin D deficiency  -     VITAMIN D, 25 HYDROXY; Future  3. Prediabetes  -     HEMOGLOBIN A1C WITH EAG; Future  4. Pre-op exam  5. Cataract of both eyes, unspecified cataract type     Follow-up and Dispositions    Return in about 3 months (around 12/8/2022) for routine, 15. HPI:   In office visit. Patient is well. Our computer system infections are down. Patient lives on the [address] and drives to her office in [address] ([facility]). Patient upset with good reason because she is driven a long way to get her preop exam and labs. I was able to see patient but unable to get patient's labs done today due to our computer system being down. Preop related to cataract surgery right eye 10/20/2022 and left eye 11/17/2022. ROS:    General: negative for - chills, fever, weight changes or malaise  HEENT: no sore throat, nasal congestion, vision problems or ear problems  Respiratory: no cough, shortness of breath, or wheezing  Cardiovascular: no chest pain, palpitations, or dyspnea on exertion  Gastrointestinal: no abdominal pain, N/V, change in bowel habits  Musculoskeletal: no back pain or joint pain  Neurological: no headache or dizziness  Endo:  No polyuria or polydipsia  : no urinary  Psychological: negative for - anxiety, depression, sleeps issues    Prior to Admission medications    Medication Sig Start Date End Date Taking? Authorizing Provider   sertraline (ZOLOFT) 50 mg tablet Take 1 Tablet by mouth in the morning.  Indications: anxiousness associated with depression 7/27/22   Nadine Orlando NP   cholecalciferol (VITAMIN D3) (1000 Units /25 mcg) tablet Take 2 Tablets by mouth daily. Indications: prevention of vitamin D deficiency 6/1/22   Sharan Cover L, NP   cyanocobalamin 1,000 mcg tablet Take 1 Tablet by mouth daily. 5/13/22   Gerre Shed, NP   cyclobenzaprine (FLEXERIL) 10 mg tablet Take 1 Tablet by mouth three (3) times daily as needed (pain). 5/13/22   Gerre Shed, NP   ibuprofen (MOTRIN) 800 mg tablet Take 1 Tablet by mouth every eight (8) hours as needed for Pain. 5/13/22   Gerre Shed, NP   omeprazole (PRILOSEC) 20 mg capsule Take 1 Capsule by mouth daily. 5/13/22   Gerre Shed, NP   pravastatin (PRAVACHOL) 20 mg tablet Take 1 Tablet by mouth nightly. Indications: stroke prevention 5/13/22   Gerre Shed, NP   phenol throat spray (CHLORASEPTIC) 1.4 % spray Take 1 Spray by mouth as needed for Sore throat. 1/14/22   Gerre Shed, NP   aspirin delayed-release 81 mg tablet Take 1 Tablet by mouth daily. Patient not taking: Reported on 11/8/2021 6/29/21   Pito Lundberg MD        Results for orders placed or performed during the hospital encounter of 03/31/22   [address] [place] [address]. Result Value Ref Range    [facility] Specimens collected/sent to [facility]        Physical Exam  Patient appears well, she is pleasant, alert, oriented x 3, in no distress. Obese. ENT normal.  Neck supple. No adenopathy or thyromegaly. EDWINA. Lungs are clear, good air entry, no wheezes  Cardiovascular, S1 and S2 normal, no murmurs, regular rate and rhythm. Chest wall negative for tenderness  Abdomen is soft without tenderness, guarding  /Anorectal, deferred. Muscleskeletal, no swelling, no tenderness, no injury. Extremities show no edema  Neurological is normal without focal findings. Skin: no concerning lesions. Psych: normal affect. Mood good. Oriented x 3.       Vitals:    09/08/22 1351   BP: 120/80   Pulse: 72   Resp: 16   Temp: 97.7 °F (36.5 °C)   SpO2: 99%   Weight: 199 lb (90.3 kg)   Height: 5' 2\" (1.575 m)     Patient is cleared for cataract surgery from primary care standpoint. *Plan of care reviewed with patient. Patient in agreement with plan and expresses understanding. All questions answered and patient encouraged to call or RTO if further questions or concerns.            Talitha Libman, MATTEO-C

## 2022-09-12 ENCOUNTER — TELEPHONE (OUTPATIENT)
Dept: PHYSICAL THERAPY | Age: 47
End: 2022-09-12

## 2022-09-12 ENCOUNTER — HOSPITAL ENCOUNTER (OUTPATIENT)
Dept: NUTRITION | Age: 47
End: 2022-09-12

## 2022-09-12 DIAGNOSIS — E78.2 MIXED HYPERLIPIDEMIA: Primary | ICD-10-CM

## 2022-09-30 LAB
ABSOLUTE LYMPHOCYTE COUNT, 10803: 1.8 K/UL (ref 1–4.8)
AVG GLU, 10930: 120 MG/DL (ref 91–123)
BASOPHILS # BLD: 0.1 K/UL (ref 0–0.2)
BASOPHILS NFR BLD: 1 % (ref 0–2)
CHOLEST SERPL-MCNC: 148 MG/DL (ref 110–200)
EOSINOPHIL # BLD: 0.2 K/UL (ref 0–0.5)
EOSINOPHIL NFR BLD: 3 % (ref 0–6)
ERYTHROCYTE [DISTWIDTH] IN BLOOD BY AUTOMATED COUNT: 13.7 % (ref 10–15.5)
FERRITIN SERPL-MCNC: 13 NG/ML (ref 10–291)
FOLATE,FOL: 14.3 NG/ML
GRANULOCYTES,GRANS: 58 % (ref 40–75)
HBA1C MFR BLD HPLC: 5.8 % (ref 4.8–5.6)
HCT VFR BLD AUTO: 39.8 % (ref 35.1–48)
HDLC SERPL-MCNC: 2.5 MG/DL (ref 0–5)
HDLC SERPL-MCNC: 60 MG/DL
HGB BLD-MCNC: 12 G/DL (ref 11.7–16)
LDL/HDL RATIO,LDHD: 1.3
LDLC SERPL CALC-MCNC: 76 MG/DL (ref 50–99)
LYMPHOCYTES, LYMLT: 30 % (ref 20–45)
MCH RBC QN AUTO: 29 PG (ref 26–34)
MCHC RBC AUTO-ENTMCNC: 30 G/DL (ref 31–36)
MCV RBC AUTO: 96 FL (ref 80–99)
MONOCYTES # BLD: 0.5 K/UL (ref 0.1–1)
MONOCYTES NFR BLD: 8 % (ref 3–12)
NEUTROPHILS # BLD AUTO: 3.5 K/UL (ref 1.8–7.7)
NON-HDL CHOLESTEROL, 011976: 88 MG/DL
PLATELET # BLD AUTO: 303 K/UL (ref 140–440)
PMV BLD AUTO: 12.2 FL (ref 9–13)
RBC # BLD AUTO: 4.13 M/UL (ref 3.8–5.2)
TRIGL SERPL-MCNC: 61 MG/DL (ref 40–149)
VIT B12 SERPL-MCNC: 345 PG/ML (ref 211–911)
VLDLC SERPL CALC-MCNC: 12 MG/DL (ref 8–30)
WBC # BLD AUTO: 6.1 K/UL (ref 4–11)

## 2022-10-09 LAB — SARS-COV-2, NAA: NOT DETECTED

## 2022-10-12 ENCOUNTER — APPOINTMENT (OUTPATIENT)
Dept: NUTRITION | Age: 47
End: 2022-10-12

## 2022-10-14 ENCOUNTER — VIRTUAL VISIT (OUTPATIENT)
Dept: FAMILY MEDICINE CLINIC | Age: 47
End: 2022-10-14
Payer: MEDICAID

## 2022-10-14 DIAGNOSIS — H26.9 CATARACT OF BOTH EYES, UNSPECIFIED CATARACT TYPE: Primary | ICD-10-CM

## 2022-10-14 DIAGNOSIS — E78.2 MIXED HYPERLIPIDEMIA: ICD-10-CM

## 2022-10-14 DIAGNOSIS — F41.9 ANXIETY: ICD-10-CM

## 2022-10-14 PROCEDURE — 99213 OFFICE O/P EST LOW 20 MIN: CPT | Performed by: NURSE PRACTITIONER

## 2022-10-14 NOTE — PROGRESS NOTES
Shavon Puente is a 41 y.o. female  established patient, here for evaluation of the following chief complaint(s):  No chief complaint on file. Shavon Puente, who was evaluated through a synchronous (real-time) audio only encounter, and/or her healthcare decision maker, is aware that it is a billable service, which includes applicable co-pays, with coverage as determined by her insurance carrier. She provided verbal consent to proceed and patient identification was verified. This visit was conducted pursuant to the emergency declaration under the [facility], [address] [place] [address] authority and the [employer] Act. A caregiver was present when appropriate. Ability to conduct physical exam was limited. The patient was located at: Home: [address] [place] [address]  The provider was located at: Facility (Appt Department): [address] [place] [address]  [address] [place]  [address]  [phone]    --Hector Lewis NP on 10/14/2022 at 4:52 PM    Assessment and Plan  1. Cataract of both eyes, unspecified cataract type  2. Mixed hyperlipidemia  3. Anxiety     Follow-up and Dispositions    Return in about 6 months (around 4/14/2023). HPI:   Virtual visit audio only. Patient says she is well. Patient was in office for pre op exam 9/8/2022. I ordered routine labs. Patient appeared well. . Patient sounds well today. She is having cataract surgery 10/20/2022 and second eye 11/2022. I advised patient I could not designate today's visit as a pre op exam because she would have to be seen in office. She says she was told our computer system was down so she rescheduled virtually. I saw patients throughout the day today without issue. Patient recent labs 9/29/2022 unremarkable. Patient taking pravastatin 20 mg with no side effects.     Patient taking Zoloft 50 mg daily with good results related to anxiety. ROS:    General: negative for - chills, fever, weight changes or malaise  HEENT: no sore throat, nasal congestion, vision problems or ear problems  Respiratory: no cough, shortness of breath, or wheezing  Cardiovascular: no chest pain, palpitations, or dyspnea on exertion  Gastrointestinal: no abdominal pain, N/V, change in bowel habits  Musculoskeletal: no back pain or joint pain  Neurological: no headache or dizziness  Endo:  No polyuria or polydipsia  : no urinary  Psychological: negative for - anxiety, depression, sleeps issues    Prior to Admission medications    Medication Sig Start Date End Date Taking? Authorizing Provider   pravastatin (PRAVACHOL) 20 mg tablet Take 1 Tablet by mouth nightly. Indications: stroke prevention 9/9/22   Daniela Martin NP   sertraline (ZOLOFT) 50 mg tablet Take 1 Tablet by mouth daily. Indications: anxiousness associated with depression 9/9/22   Daniela Martin NP   omeprazole (PRILOSEC) 20 mg capsule Take 1 Capsule by mouth daily. 9/9/22   Daniela Martin NP   ibuprofen (MOTRIN) 800 mg tablet Take 1 Tablet by mouth every eight (8) hours as needed for Pain. 9/9/22   Daniela Martin NP   cyclobenzaprine (FLEXERIL) 10 mg tablet Take 1 Tablet by mouth three (3) times daily as needed (pain). 9/9/22   Daniela Martin NP   cyanocobalamin 1,000 mcg tablet Take 1 Tablet by mouth daily. 9/9/22   Daniela Martin NP   cholecalciferol (VITAMIN D3) (1000 Units /25 mcg) tablet Take 2 Tablets by mouth daily. Indications: prevention of vitamin D deficiency 6/1/22   Saint Nash L, NP   phenol throat spray (CHLORASEPTIC) 1.4 % spray Take 1 Spray by mouth as needed for Sore throat. 1/14/22   Daniela Martin NP   aspirin delayed-release 81 mg tablet Take 1 Tablet by mouth daily.   Patient not taking: Reported on 11/8/2021 6/29/21   Quentin Ford MD        Results for orders placed or performed in visit on 09/29/22   LIPID PANEL   Result Value Ref Range Triglyceride 61 40 - 149 mg/dL    HDL Cholesterol 60 >=40 mg/dL    Cholesterol, total 148 110 - 200 mg/dL    CHOLESTEROL/HDL 2.5 0.0 - 5.0    Non-HDL Cholesterol 88 <130 mg/dL    LDL, calculated 76 50 - 99 mg/dL    VLDL, calculated 12 8 - 30 mg/dL    LDL/HDL Ratio 1.3    FERRITIN   Result Value Ref Range    Ferritin 13 10 - 291 ng/mL   FOLATE   Result Value Ref Range    Folate 14.30 >=3.10 ng/mL   VITAMIN B12   Result Value Ref Range    Vitamin B12 345 211 - 911 pg/mL   CBC WITH AUTOMATED DIFF   Result Value Ref Range    WBC 6.1 4.0 - 11.0 K/uL    RBC 4.13 3.80 - 5.20 M/uL    HGB 12.0 11.7 - 16.0 g/dL    HCT 39.8 35.1 - 48.0 %    MCV 96 80 - 99 fL    MCH 29 26 - 34 pg    MCHC 30 (L) 31 - 36 g/dL    RDW 13.7 10.0 - 15.5 %    PLATELET 452 328 - 822 K/uL    MPV 12.2 9.0 - 13.0 fL    NEUTROPHILS 58 40 - 75 %    Lymphocytes 30 20 - 45 %    MONOCYTES 8 3 - 12 %    EOSINOPHILS 3 0 - 6 %    BASOPHILS 1 0 - 2 %    ABS. NEUTROPHILS 3.5 1.8 - 7.7 K/uL    ABSOLUTE LYMPHOCYTE COUNT 1.8 1.0 - 4.8 K/uL    ABS. MONOCYTES 0.5 0.1 - 1.0 K/uL    ABS. EOSINOPHILS 0.2 0.0 - 0.5 K/uL    ABS. BASOPHILS 0.1 0.0 - 0.2 K/uL   HEMOGLOBIN A1C W/O EAG   Result Value Ref Range    Hemoglobin A1c 5.8 (H) 4.8 - 5.6 %    AVG  91 - 123 mg/dL      Physical exam - audio only  Patient appears well, is pleasant, alert, oriented x 3, in no distress. Lungs, sound to have normal respiratory effort     Psych: normal affect. Mood good. Oriented x 3. Judgement and insight intact. There were no vitals filed for this visit. *Plan of care reviewed with patient. Patient in agreement with plan and expresses understanding. All questions answered and patient encouraged to call or RTO if further questions or concerns.      On this date 10/14/2022 I have spent 25 minutes reviewing previous notes, test results and face to face with the patient discussing the diagnosis and importance of compliance with the treatment plan as well as documenting on the day of the visit.       Bernarda Ohara NP-C

## 2022-10-17 ENCOUNTER — DOCUMENTATION ONLY (OUTPATIENT)
Dept: FAMILY MEDICINE CLINIC | Age: 47
End: 2022-10-17

## 2022-10-21 DIAGNOSIS — J32.1 CHRONIC FRONTAL SINUSITIS: Primary | ICD-10-CM

## 2022-10-21 RX ORDER — AMOXICILLIN AND CLAVULANATE POTASSIUM 875; 125 MG/1; MG/1
1 TABLET, FILM COATED ORAL 2 TIMES DAILY
Qty: 20 TABLET | Refills: 0 | Status: SHIPPED | OUTPATIENT
Start: 2022-10-21 | End: 2022-10-31

## 2022-10-21 NOTE — PROGRESS NOTES
Spoke with patient by phone and complains of severe sinus pressure with a thick dark yellow nasal discharge.

## 2022-10-24 NOTE — PROGRESS NOTES
Your cholesterol panel is now within normal limits. Your A1c was 5.8%, prediabetic range. Try to avoid sugary drinks if you drink them, processed foods and carbohydrates. Ferritin was normal.  Folate was normal.  CBC was unremarkable.

## 2022-12-08 ENCOUNTER — OFFICE VISIT (OUTPATIENT)
Dept: FAMILY MEDICINE CLINIC | Age: 47
End: 2022-12-08
Payer: MEDICAID

## 2022-12-08 VITALS
HEIGHT: 62 IN | SYSTOLIC BLOOD PRESSURE: 134 MMHG | OXYGEN SATURATION: 100 % | RESPIRATION RATE: 16 BRPM | HEART RATE: 85 BPM | BODY MASS INDEX: 37.91 KG/M2 | TEMPERATURE: 97.7 F | DIASTOLIC BLOOD PRESSURE: 86 MMHG | WEIGHT: 206 LBS

## 2022-12-08 DIAGNOSIS — M25.512 CHRONIC PAIN OF BOTH SHOULDERS: ICD-10-CM

## 2022-12-08 DIAGNOSIS — R14.2 BELCHING: Primary | ICD-10-CM

## 2022-12-08 DIAGNOSIS — M25.511 CHRONIC PAIN OF BOTH SHOULDERS: ICD-10-CM

## 2022-12-08 DIAGNOSIS — R00.2 PALPITATIONS: ICD-10-CM

## 2022-12-08 DIAGNOSIS — G89.29 CHRONIC PAIN OF BOTH SHOULDERS: ICD-10-CM

## 2022-12-08 DIAGNOSIS — Z00.00 PREVENTATIVE HEALTH CARE: ICD-10-CM

## 2022-12-08 DIAGNOSIS — R73.03 PRE-DIABETES: ICD-10-CM

## 2022-12-08 DIAGNOSIS — Z83.2 FAMILY HISTORY OF AUTOIMMUNE DISORDER: ICD-10-CM

## 2022-12-08 DIAGNOSIS — M25.50 ARTHRALGIA, UNSPECIFIED JOINT: ICD-10-CM

## 2022-12-08 DIAGNOSIS — R26.89 LOSS OF BALANCE: ICD-10-CM

## 2022-12-08 DIAGNOSIS — Z12.11 SCREEN FOR COLON CANCER: ICD-10-CM

## 2022-12-08 RX ORDER — ASPIRIN 81 MG/1
81 TABLET ORAL DAILY
Qty: 90 TABLET | Refills: 3 | Status: SHIPPED | OUTPATIENT
Start: 2022-12-08

## 2022-12-08 NOTE — PROGRESS NOTES
Lissette Mujica is a 41 y.o. presents today for   Chief Complaint   Patient presents with    Shoulder Pain     Is someone accompanying this pt? No    Is the patient using any DME equipment during OV? No    There were no vitals taken for this visit. Depression Screening:   3 most recent PHQ Screens 12/8/2022   Little interest or pleasure in doing things Not at all   Feeling down, depressed, irritable, or hopeless Not at all   Total Score PHQ 2 0       Health Maintenance: reviewed and discussed and ordered per Provider. Health Maintenance Due   Topic Date Due    Hepatitis C Screening  Never done    COVID-19 Vaccine (1) Never done    DTaP/Tdap/Td series (1 - Tdap) Never done    Colorectal Cancer Screening Combo  Never done    Flu Vaccine (1) Never done         Coordination of Care:   1. \"Have you been to the ER, urgent care clinic since your last visit? Hospitalized since your last visit? \" No    2. \"Have you seen or consulted any other health care providers outside of the [address] [place] [address] since your last visit? \" No     3. For patients aged 39-70: Has the patient had a colonoscopy / FIT/ Cologuard? Yes - no Care Gap present    If the patient is female:    4. For patients aged 41-77: Has the patient had a mammogram within the past 2 years? Yes - no Care Gap present    5. For patients aged 21-65: Has the patient had a pap smear? Yes - no Care Gap present     Advanced Directive:  1. Do you have an Advanced Directive? No     2. Would you like information on Advanced Directives?  No    Gabby Forbes CMA
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
## 2022-12-14 LAB
ABSOLUTE LYMPHOCYTE COUNT, 10803: 2 K/UL (ref 1–4.8)
AVG GLU, 10930: 122 MG/DL (ref 91–123)
BASOPHILS # BLD: 0.1 K/UL (ref 0–0.2)
BASOPHILS NFR BLD: 1 % (ref 0–2)
C3 SERPL-MCNC: 144 MG/DL (ref 83–177)
EOSINOPHIL # BLD: 0.2 K/UL (ref 0–0.5)
EOSINOPHIL NFR BLD: 3 % (ref 0–6)
ERYTHROCYTE [DISTWIDTH] IN BLOOD BY AUTOMATED COUNT: 13.6 % (ref 10–15.5)
GRANULOCYTES,GRANS: 55 % (ref 40–75)
HBA1C MFR BLD HPLC: 5.9 % (ref 4.8–5.6)
HCT VFR BLD AUTO: 37.2 % (ref 35.1–48)
HGB BLD-MCNC: 11.3 G/DL (ref 11.7–16)
LYMPHOCYTES, LYMLT: 34 % (ref 20–45)
MCH RBC QN AUTO: 30 PG (ref 26–34)
MCHC RBC AUTO-ENTMCNC: 30 G/DL (ref 31–36)
MCV RBC AUTO: 97 FL (ref 80–99)
MONOCYTES # BLD: 0.4 K/UL (ref 0.1–1)
MONOCYTES NFR BLD: 7 % (ref 3–12)
NEUTROPHILS # BLD AUTO: 3.4 K/UL (ref 1.8–7.7)
PLATELET # BLD AUTO: 312 K/UL (ref 140–440)
PMV BLD AUTO: 12.2 FL (ref 9–13)
RBC # BLD AUTO: 3.82 M/UL (ref 3.8–5.2)
WBC # BLD AUTO: 6.1 K/UL (ref 4–11)

## 2022-12-15 LAB
ANA SCREEN, IFA: NEGATIVE
ANTI-DNA (DS) AB, QT., 096343: 3 IU/ML (ref 0–4)
H PYLORI (UREA BREATH),1814839: NEGATIVE

## 2022-12-22 ENCOUNTER — PATIENT MESSAGE (OUTPATIENT)
Dept: FAMILY MEDICINE CLINIC | Age: 47
End: 2022-12-22

## 2023-01-05 ENCOUNTER — VIRTUAL VISIT (OUTPATIENT)
Dept: FAMILY MEDICINE CLINIC | Age: 48
End: 2023-01-05
Payer: MEDICAID

## 2023-01-05 DIAGNOSIS — K75.81 NASH (NONALCOHOLIC STEATOHEPATITIS): ICD-10-CM

## 2023-01-05 DIAGNOSIS — R14.2 BELCHING: Primary | ICD-10-CM

## 2023-01-05 DIAGNOSIS — K21.9 CHRONIC GERD: ICD-10-CM

## 2023-01-05 DIAGNOSIS — F41.9 ANXIETY: ICD-10-CM

## 2023-01-05 DIAGNOSIS — K58.1 IRRITABLE BOWEL SYNDROME WITH CONSTIPATION: ICD-10-CM

## 2023-01-05 PROCEDURE — 99213 OFFICE O/P EST LOW 20 MIN: CPT | Performed by: NURSE PRACTITIONER

## 2023-01-05 RX ORDER — OMEPRAZOLE 20 MG/1
20 CAPSULE, DELAYED RELEASE ORAL DAILY
Qty: 90 CAPSULE | Refills: 1 | Status: SHIPPED | OUTPATIENT
Start: 2023-01-05

## 2023-01-05 RX ORDER — DICYCLOMINE HYDROCHLORIDE 10 MG/1
10 CAPSULE ORAL 3 TIMES DAILY
Qty: 90 CAPSULE | Refills: 0 | Status: SHIPPED | OUTPATIENT
Start: 2023-01-05

## 2023-01-05 RX ORDER — SERTRALINE HYDROCHLORIDE 50 MG/1
50 TABLET, FILM COATED ORAL DAILY
Qty: 90 TABLET | Refills: 1 | Status: SHIPPED | OUTPATIENT
Start: 2023-01-05

## 2023-01-05 NOTE — PROGRESS NOTES
Sunil Jensen is a 41 y.o. female  established patient, here for evaluation of the following chief complaint(s):  No chief complaint on file. Sunil Jensen, who was evaluated through a synchronous (real-time) audio-video encounter, and/or her healthcare decision maker, is aware that it is a billable service, which includes applicable co-pays, with coverage as determined by her insurance carrier. She provided verbal consent to proceed and patient identification was verified. This visit was conducted pursuant to the emergency declaration under the [address] [place] [address], [address] [place] [address] authority and the [employer] Act. A caregiver was present when appropriate. Ability to conduct physical exam was limited. The patient was located at: Home: [address] [place] [address]  The provider was located at: Home: [unfilled] at residence     --Espinoza Palacio NP on 1/5/2023 at 3:49 PM    Assessment and Plan  1. Belching  -     REFERRAL TO GASTROENTEROLOGY  2. Irritable bowel syndrome with constipation  -     REFERRAL TO GASTROENTEROLOGY  -     dicyclomine (BENTYL) 10 mg capsule; Take 1 Capsule by mouth three (3) times daily. Indications: irritable colon, Normal, Disp-90 Capsule, R-0  3. DEE (nonalcoholic steatohepatitis)  -     REFERRAL TO GASTROENTEROLOGY  4. Anxiety  -     sertraline (ZOLOFT) 50 mg tablet; Take 1 Tablet by mouth daily. Indications: anxiousness associated with depression, Normal, Disp-90 Tablet, R-1  5. Chronic GERD  -     omeprazole (PRILOSEC) 20 mg capsule; Take 1 Capsule by mouth daily. , Normal, Disp-90 Capsule, R-1       HPI:   Virtual visit. Patient appears well. She says towards July 2023 she is going to look for jobs she can do from home so she does not have to drive so much. She lives on the [address] [place] [address] and drives to [place] for work. She tested for positive for covid [place] [facility].  She had a left eye infection after her covid symptoms resolved. She saw her eye provider because she had a cataract surgery. Her eye infection has resolved. Patient's autoimmune profile was negative. Patient's had a history of chronic joint pain. Patient had no complaints of joint pain today. Patient's been told that she has disc problems in her neck. In the past she has had some tingling and numbness in her arms but none today. 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 ROS:    General: negative for - chills, fever, weight changes or malaise  HEENT: no sore throat, nasal congestion, vision problems or ear problems  Respiratory: no cough, shortness of breath, or wheezing  Cardiovascular: no chest pain, palpitations, or dyspnea on exertion  Gastrointestinal: no abdominal pain, N/V, change in bowel habits  Musculoskeletal: no back pain or joint pain  Neurological: no headache or dizziness  Endo:  No polyuria or polydipsia  : no urinary  Psychological: negative for - anxiety, depression, sleeps issues    Prior to Admission medications    Medication Sig Start Date End Date Taking? Authorizing Provider   dicyclomine (BENTYL) 10 mg capsule Take 1 Capsule by mouth three (3) times daily. Indications: irritable colon 1/5/23  Yes Kassandra Gill NP   sertraline (ZOLOFT) 50 mg tablet Take 1 Tablet by mouth daily. Indications: anxiousness associated with depression 1/5/23  Yes Kassandra Gill NP   omeprazole (PRILOSEC) 20 mg capsule Take 1 Capsule by mouth daily. 1/5/23  Yes Slime Gill NP   aspirin delayed-release 81 mg tablet Take 1 Tablet by mouth daily. 12/8/22   Kg Neighbours, NP   pravastatin (PRAVACHOL) 20 mg tablet Take 1 Tablet by mouth nightly. Indications: stroke prevention 9/9/22   Kg Monaes, NP   ibuprofen (MOTRIN) 800 mg tablet Take 1 Tablet by mouth every eight (8) hours as needed for Pain.  9/9/22   Kg Monaes, NP   cyclobenzaprine (FLEXERIL) 10 mg tablet Take 1 Tablet by mouth three (3) times daily as needed (pain). 9/9/22   Honey Garibay NP   cyanocobalamin 1,000 mcg tablet Take 1 Tablet by mouth daily. 9/9/22   Honey Garibay NP   sertraline (ZOLOFT) 50 mg tablet Take 1 Tablet by mouth daily. Indications: anxiousness associated with depression 9/9/22 1/5/23  Honey Garibay NP   omeprazole (PRILOSEC) 20 mg capsule Take 1 Capsule by mouth daily. 9/9/22 1/5/23  Honey Garibay NP   cholecalciferol (VITAMIN D3) (1000 Units /25 mcg) tablet Take 2 Tablets by mouth daily. Indications: prevention of vitamin D deficiency 6/1/22   Trupti FONG NP   phenol throat spray (CHLORASEPTIC) 1.4 % spray Take 1 Spray by mouth as needed for Sore throat. 1/14/22   Honey Garibay NP        Results for orders placed or performed in visit on 12/08/22   COMPLEMENT, C3   Result Value Ref Range    Complement C3 144 83 - 177 mg/dL   DNA AB, DOUBLE STRANDED, IGG   Result Value Ref Range    Anti-DNA (DS) Ab, QT 3 0 - 4 IU/mL   NISA, DIRECT, W/REFLEX   Result Value Ref Range    NISA SCREEN, IFA Negative Negative   CBC WITH AUTOMATED DIFF   Result Value Ref Range    WBC 6.1 4.0 - 11.0 K/uL    RBC 3.82 3.80 - 5.20 M/uL    HGB 11.3 (L) 11.7 - 16.0 g/dL    HCT 37.2 35.1 - 48.0 %    MCV 97 80 - 99 fL    MCH 30 26 - 34 pg    MCHC 30 (L) 31 - 36 g/dL    RDW 13.6 10.0 - 15.5 %    PLATELET 204 771 - 623 K/uL    MPV 12.2 9.0 - 13.0 fL    NEUTROPHILS 55 40 - 75 %    Lymphocytes 34 20 - 45 %    MONOCYTES 7 3 - 12 %    EOSINOPHILS 3 0 - 6 %    BASOPHILS 1 0 - 2 %    ABS. NEUTROPHILS 3.4 1.8 - 7.7 K/uL    ABSOLUTE LYMPHOCYTE COUNT 2.0 1.0 - 4.8 K/uL    ABS. MONOCYTES 0.4 0.1 - 1.0 K/uL    ABS. EOSINOPHILS 0.2 0.0 - 0.5 K/uL    ABS.  BASOPHILS 0.1 0.0 - 0.2 K/uL   HEMOGLOBIN A1C W/O EAG   Result Value Ref Range    Hemoglobin A1c 5.9 (H) 4.8 - 5.6 %    AVG  91 - 123 mg/dL   H. PYLORI BREATH TEST   Result Value Ref Range    H Pylori (Urea Breath) Negative Negative        Physical Exam  The patient appears well, is pleasant, alert, oriented x 3, in no distress. Eyes, no discharge. Normal nose. Head, normocephalic and atraumatic. Nose appears normal.  Neck, no visible neck mass. Lungs, appears to have normal respiratory effort     Skin, no jaundice and patient does not appear pale. Psych: normal affect. Mood good. Oriented x 3. Judgement and insight intact. There were no vitals filed for this visit. *Plan of care reviewed with patient. Patient in agreement with plan and expresses understanding. All questions answered and patient encouraged to call or RTO if further questions or concerns. On this date 01/05/2023 I have spent 30 minutes reviewing previous notes, test results and face to face with the patient discussing the diagnosis and importance of compliance with the treatment plan as well as documenting on the day of the visit.       Chad Dupont, KARONC

## 2023-01-09 NOTE — TELEPHONE ENCOUNTER
From: Elina Brown  To: Lilliam Williamson NP  Sent: 12/22/2022 3:35 PM EST  Subject: Medicine     Good Afternoon I have a question can I take the Zoloft and Prevstatin together! I want to make sure there will be no reaction and Ill be ok! I read that Zoloft can raise your Cholesterol! I already have issues with that! But I need to take something because Josefinae Glass been depressed and crazy things run threw my head !  Thank you

## 2023-01-25 ENCOUNTER — VIRTUAL VISIT (OUTPATIENT)
Dept: FAMILY MEDICINE CLINIC | Age: 48
End: 2023-01-25
Payer: MEDICAID

## 2023-01-25 DIAGNOSIS — J01.11 ACUTE RECURRENT FRONTAL SINUSITIS: Primary | ICD-10-CM

## 2023-01-25 PROCEDURE — 99213 OFFICE O/P EST LOW 20 MIN: CPT | Performed by: NURSE PRACTITIONER

## 2023-01-25 RX ORDER — AMOXICILLIN AND CLAVULANATE POTASSIUM 875; 125 MG/1; MG/1
1 TABLET, FILM COATED ORAL 2 TIMES DAILY
Qty: 20 TABLET | Refills: 0 | Status: SHIPPED | OUTPATIENT
Start: 2023-01-25 | End: 2023-02-04

## 2023-01-25 NOTE — PROGRESS NOTES
Darlene Claudio is a 41 y.o. female  established patient, here for evaluation of the following chief complaint(s):  No chief complaint on file. Darlene Claudio, who was evaluated through a synchronous (real-time) audio-video encounter, and/or her healthcare decision maker, is aware that it is a billable service, which includes applicable co-pays, with coverage as determined by her insurance carrier. She provided verbal consent to proceed and patient identification was verified. This visit was conducted pursuant to the emergency declaration under the [address], [address] [place] [address] authority and the [employer] Act. A caregiver was present when appropriate. Ability to conduct physical exam was limited. The patient was located at: Other: in car  The provider was located at: Facility (Appt Department): [address] [place] [address] 4  [address]  [phone]    --Pranay Jorge NP on 1/25/2023 at 4:49 PM    Assessment and Plan  1. Acute recurrent frontal sinusitis  -     amoxicillin-clavulanate (Augmentin) 875-125 mg per tablet; Take 1 Tablet by mouth two (2) times a day for 10 days. , Normal, Disp-20 Tablet, R-0  -     REFERRAL TO ENT-OTOLARYNGOLOGY       HPI:   Virtual visit. Patient appears well on video. Patient thinks she has another sinus infection. She says she has thick nasal discharge that is green. Patient declined Flonase. Advised patient to get normal saline nose spray to help loosen up thick discharge in her nose. Patient denies fever, chest pain or shortness of breath.     ROS:    General: negative for - chills, fever, weight changes or malaise  HEENT: Positive for nasal congestion, sinus pain and sinus headache no sore throat, vision problems or ear problems  Respiratory: no cough, shortness of breath, or wheezing  Cardiovascular: no chest pain, palpitations, or dyspnea on exertion  Gastrointestinal: no abdominal pain, N/V, change in bowel habits  Musculoskeletal: no back pain or joint pain  Neurological: no headache or dizziness  Endo:  No polyuria or polydipsia  : no urinary  Psychological: negative for - anxiety, depression, sleeps issues    Prior to Admission medications    Medication Sig Start Date End Date Taking? Authorizing Provider   amoxicillin-clavulanate (Augmentin) 875-125 mg per tablet Take 1 Tablet by mouth two (2) times a day for 10 days. 1/25/23 2/4/23 Yes Rosaline Gill NP   dicyclomine (BENTYL) 10 mg capsule Take 1 Capsule by mouth three (3) times daily. Indications: irritable colon 1/5/23   Delaney FONG NP   sertraline (ZOLOFT) 50 mg tablet Take 1 Tablet by mouth daily. Indications: anxiousness associated with depression 1/5/23   Verona Nowak NP   omeprazole (PRILOSEC) 20 mg capsule Take 1 Capsule by mouth daily. 1/5/23   Verona Nowak NP   aspirin delayed-release 81 mg tablet Take 1 Tablet by mouth daily. 12/8/22   Verona Nowak NP   pravastatin (PRAVACHOL) 20 mg tablet Take 1 Tablet by mouth nightly. Indications: stroke prevention 9/9/22   Verona Nowak NP   ibuprofen (MOTRIN) 800 mg tablet Take 1 Tablet by mouth every eight (8) hours as needed for Pain. 9/9/22   Verona Nowak NP   cyclobenzaprine (FLEXERIL) 10 mg tablet Take 1 Tablet by mouth three (3) times daily as needed (pain). 9/9/22   Verona Nowak NP   cyanocobalamin 1,000 mcg tablet Take 1 Tablet by mouth daily. 9/9/22   Verona Nowak NP   cholecalciferol (VITAMIN D3) (1000 Units /25 mcg) tablet Take 2 Tablets by mouth daily. Indications: prevention of vitamin D deficiency 6/1/22   Delaney FONG NP   phenol throat spray (CHLORASEPTIC) 1.4 % spray Take 1 Spray by mouth as needed for Sore throat.  1/14/22   Verona Nowak NP        Results for orders placed or performed in visit on 12/08/22   COMPLEMENT, C3   Result Value Ref Range    Complement C3 144 83 - 177 mg/dL   DNA AB, DOUBLE STRANDED, IGG   Result Value Ref Range    Anti-DNA (DS) Ab, QT 3 0 - 4 IU/mL   NISA, DIRECT, W/REFLEX   Result Value Ref Range    NISA SCREEN, IFA Negative Negative   CBC WITH AUTOMATED DIFF   Result Value Ref Range    WBC 6.1 4.0 - 11.0 K/uL    RBC 3.82 3.80 - 5.20 M/uL    HGB 11.3 (L) 11.7 - 16.0 g/dL    HCT 37.2 35.1 - 48.0 %    MCV 97 80 - 99 fL    MCH 30 26 - 34 pg    MCHC 30 (L) 31 - 36 g/dL    RDW 13.6 10.0 - 15.5 %    PLATELET 458 337 - 658 K/uL    MPV 12.2 9.0 - 13.0 fL    NEUTROPHILS 55 40 - 75 %    Lymphocytes 34 20 - 45 %    MONOCYTES 7 3 - 12 %    EOSINOPHILS 3 0 - 6 %    BASOPHILS 1 0 - 2 %    ABS. NEUTROPHILS 3.4 1.8 - 7.7 K/uL    ABSOLUTE LYMPHOCYTE COUNT 2.0 1.0 - 4.8 K/uL    ABS. MONOCYTES 0.4 0.1 - 1.0 K/uL    ABS. EOSINOPHILS 0.2 0.0 - 0.5 K/uL    ABS. BASOPHILS 0.1 0.0 - 0.2 K/uL   HEMOGLOBIN A1C W/O EAG   Result Value Ref Range    Hemoglobin A1c 5.9 (H) 4.8 - 5.6 %    AVG  91 - 123 mg/dL   H. PYLORI BREATH TEST   Result Value Ref Range    H Pylori (Urea Breath) Negative Negative        Physical Exam  The patient appears well, is pleasant, alert, oriented x 3, in no distress. Eyes, no discharge. Normal nose. Head, normocephalic and atraumatic. Nose appears normal.  Neck, no visible neck mass. Lungs, appears to have normal respiratory effort     Skin, no jaundice and patient does not appear pale. Psych: normal affect. Mood good. Oriented x 3. Judgement and insight intact. There were no vitals filed for this visit. *Plan of care reviewed with patient. Patient in agreement with plan and expresses understanding. All questions answered and patient encouraged to call or RTO if further questions or concerns.      On this date 01/25/2023 I have spent 25 minutes reviewing previous notes, test results and face to face with the patient discussing the diagnosis and importance of compliance with the treatment plan as well as documenting on the day of the visit.       MINDI Cha

## 2023-01-30 DIAGNOSIS — R73.03 PREDIABETES: Primary | ICD-10-CM

## 2023-01-30 RX ORDER — LANCETS
EACH MISCELLANEOUS
Qty: 50 EACH | Refills: 1 | Status: SHIPPED | OUTPATIENT
Start: 2023-01-30

## 2023-01-30 RX ORDER — INSULIN PUMP SYRINGE, 3 ML
EACH MISCELLANEOUS
Qty: 1 KIT | Refills: 0 | Status: SHIPPED | OUTPATIENT
Start: 2023-01-30

## 2023-01-30 RX ORDER — IBUPROFEN 200 MG
CAPSULE ORAL
Qty: 50 STRIP | Refills: 1 | Status: SHIPPED | OUTPATIENT
Start: 2023-01-30

## 2023-02-28 ENCOUNTER — TELEPHONE (OUTPATIENT)
Facility: CLINIC | Age: 48
End: 2023-02-28

## 2023-02-28 ENCOUNTER — OFFICE VISIT (OUTPATIENT)
Facility: CLINIC | Age: 48
End: 2023-02-28
Payer: MEDICAID

## 2023-02-28 VITALS
BODY MASS INDEX: 37.25 KG/M2 | OXYGEN SATURATION: 98 % | TEMPERATURE: 97.9 F | RESPIRATION RATE: 16 BRPM | SYSTOLIC BLOOD PRESSURE: 124 MMHG | DIASTOLIC BLOOD PRESSURE: 87 MMHG | WEIGHT: 202.4 LBS | HEIGHT: 62 IN | HEART RATE: 80 BPM

## 2023-02-28 DIAGNOSIS — F41.9 ANXIETY DISORDER, UNSPECIFIED TYPE: ICD-10-CM

## 2023-02-28 DIAGNOSIS — R00.0 RACING HEART BEAT: ICD-10-CM

## 2023-02-28 DIAGNOSIS — K21.9 GASTRO-ESOPHAGEAL REFLUX DISEASE WITHOUT ESOPHAGITIS: ICD-10-CM

## 2023-02-28 DIAGNOSIS — R11.0 NAUSEA: ICD-10-CM

## 2023-02-28 DIAGNOSIS — R73.03 PREDIABETES: Primary | ICD-10-CM

## 2023-02-28 DIAGNOSIS — F43.10 POST-TRAUMATIC STRESS DISORDER, UNSPECIFIED: ICD-10-CM

## 2023-02-28 PROCEDURE — 99214 OFFICE O/P EST MOD 30 MIN: CPT | Performed by: NURSE PRACTITIONER

## 2023-02-28 SDOH — ECONOMIC STABILITY: HOUSING INSECURITY
IN THE LAST 12 MONTHS, WAS THERE A TIME WHEN YOU DID NOT HAVE A STEADY PLACE TO SLEEP OR SLEPT IN A SHELTER (INCLUDING NOW)?: NO

## 2023-02-28 SDOH — ECONOMIC STABILITY: FOOD INSECURITY: WITHIN THE PAST 12 MONTHS, THE FOOD YOU BOUGHT JUST DIDN'T LAST AND YOU DIDN'T HAVE MONEY TO GET MORE.: NEVER TRUE

## 2023-02-28 SDOH — ECONOMIC STABILITY: FOOD INSECURITY: WITHIN THE PAST 12 MONTHS, YOU WORRIED THAT YOUR FOOD WOULD RUN OUT BEFORE YOU GOT MONEY TO BUY MORE.: NEVER TRUE

## 2023-02-28 SDOH — ECONOMIC STABILITY: INCOME INSECURITY: HOW HARD IS IT FOR YOU TO PAY FOR THE VERY BASICS LIKE FOOD, HOUSING, MEDICAL CARE, AND HEATING?: NOT HARD AT ALL

## 2023-02-28 ASSESSMENT — PATIENT HEALTH QUESTIONNAIRE - PHQ9
SUM OF ALL RESPONSES TO PHQ QUESTIONS 1-9: 0
1. LITTLE INTEREST OR PLEASURE IN DOING THINGS: 0
SUM OF ALL RESPONSES TO PHQ QUESTIONS 1-9: 0
SUM OF ALL RESPONSES TO PHQ QUESTIONS 1-9: 0
SUM OF ALL RESPONSES TO PHQ9 QUESTIONS 1 & 2: 0
2. FEELING DOWN, DEPRESSED OR HOPELESS: 0
SUM OF ALL RESPONSES TO PHQ QUESTIONS 1-9: 0

## 2023-02-28 NOTE — TELEPHONE ENCOUNTER
Called Pt to see if she wanted to  paperwork. Pt said  said they will email it to her. She ask If it was ok for her to start metformin as she has had heart surgery and have a large heart. Called Denisa and express Pt concern to her. Denisa said it was ok for her to take and we did not get labs results back yet. Informed Pt this and she confirmed understanding.

## 2023-02-28 NOTE — PATIENT INSTRUCTIONS
Pt referred to : [facility]  [address] , [address] [place] [address] Road                          [phone]) [phone]

## 2023-02-28 NOTE — PROGRESS NOTES
Christiano Lowry is a 41 y.o. female  established patient, here for evaluation of the following chief complaint(s):  Chief Complaint   Patient presents with    Blood Sugar Problem    Nausea      Assessment and Plan  1. Prediabetes  -     Hemoglobin A1C; Future  -     metFORMIN (GLUCOPHAGE) 500 MG tablet; Take 1 tablet by mouth daily (with breakfast), Disp-90 tablet, R-1Normal  -     Urinalysis with Microscopic; Future  2. Anxiety disorder, unspecified type  -     External Referral To Psychiatry  -     Comprehensive Metabolic Panel; Future  3. Gastro-esophageal reflux disease without esophagitis  4. Post-traumatic stress disorder, unspecified  -     External Referral To Psychiatry  5. Racing heart beat  -     Comprehensive Metabolic Panel; Future  6. Nausea  -     Urinalysis with Microscopic; Future     Return in about 2 weeks (around 3/14/2023) for Routine, 20. HPI:   In office visit. Prediabetic, A1C 6% today    She says her fasting blood sugar 250 this morning on her blood glucose monitor    Pt has an appt with eye provider 4/10/2023, she says she has been seeing \"floaters\" and has recent bilateral cataract surgery     Pt has a HX of anxiety and she admits she only takes half of her Zoloft as needed. I asked her to take her Zoloft daily as directed. Patient reports she has been nauseous since Sunday. She has a referral pending for GI related to IBS. She felt SOB after work yesterday but denies any chest pain.      ROS:    General: negative for - chills, fever, weight changes or malaise  HEENT: no sore throat, nasal congestion, vision problems or ear problems  Respiratory: no cough, shortness of breath, or wheezing  Cardiovascular: no chest pain, palpitations, or dyspnea on exertion  Gastrointestinal: no abdominal pain, N/V, change in bowel habits  Musculoskeletal: no back pain or joint pain  Neurological: no headache or dizziness  Endo:  No polyuria or polydipsia  : no urinary  Skin:   Psychological: negative for - anxiety, depression, sleeps issues    Prior to Admission medications    Medication Sig Start Date End Date Taking?  Authorizing Provider   metFORMIN (GLUCOPHAGE) 500 MG tablet Take 1 tablet by mouth daily (with breakfast) 2/28/23  Yes PIPER Hamilton CNP   aspirin 81 MG EC tablet Take 81 mg by mouth daily 12/8/22   Ar Automatic Reconciliation   vitamin D (CHOLECALCIFEROL) 25 MCG (1000 UT) TABS tablet Take 2,000 Units by mouth daily 6/1/22   Ar Automatic Reconciliation   cyanocobalamin 1000 MCG tablet Take 1,000 mcg by mouth daily 9/9/22   Ar Automatic Reconciliation   cyclobenzaprine (FLEXERIL) 10 MG tablet Take 10 mg by mouth 3 times daily as needed 9/9/22   Ar Automatic Reconciliation   ibuprofen (ADVIL;MOTRIN) 800 MG tablet Take 800 mg by mouth every 8 hours as needed 9/9/22   Ar Automatic Reconciliation   omeprazole (PRILOSEC) 20 MG delayed release capsule Take 20 mg by mouth daily 9/9/22   Ar Automatic Reconciliation   phenol 1.4 % LIQD mouth spray Take 1 spray by mouth as needed 1/14/22   Ar Automatic Reconciliation   pravastatin (PRAVACHOL) 20 MG tablet Take 20 mg by mouth 9/9/22   Ar Automatic Reconciliation   sertraline (ZOLOFT) 50 MG tablet Take 50 mg by mouth daily 9/9/22   Ar Automatic Reconciliation        Results for orders placed or performed in visit on 02/28/23   Urinalysis with Microscopic   Result Value Ref Range    Color, UA Yellow Colorless, Pale Yellow, Light Yellow, Yellow, Dark Yellow, S    Clarity, UA Clear Clear, Slightly Cloudy    Specific Gravity 1.005 1.005 - 1.030    pH, Urine 6.0 5.0 - 8.0 pH    Protein, UA Negative Negative, Trace mg/dL    Glucose Negative Negative mg/dL    Ketones, Urine Trace (A) Negative, NOT TESTED mg/dL    Total Bilirubin Negative Negative    BLOOD Negative Negative    Nitrite, Urine Negative Negative    Leukocyte Esterase, Urine Negative Negative    Urobilinogen 0.2 <2.0 mg/dL mg/dL    WBC, UA 0-2 0 - 5 /hpf    RBC, UA 0-2 Negative, 0-2 /hpf Bacteria, UA Negative Negative    Hyaline Casts, UA 0-2 0 - 2 /lpf    Epithelial Cells 0-2 None, 0-2 /hpf   Comprehensive Metabolic Panel   Result Value Ref Range    Glucose 97 70 - 99 mg/dL    BUN 8 6 - 22 mg/dL    Creatinine 0.7 0.5 - 1.2 mg/dL    Sodium 138 133 - 145 mmol/L    Potassium 4.4 3.5 - 5.5 mmol/L    Chloride 107 98 - 110 mmol/L    CO2 19 (L) 20 - 32 mmol/L    AST 18 10 - 37 U/L    ALT 13 5 - 40 U/L    Alkaline Phosphatase 69 25 - 115 U/L    Total Bilirubin 0.1 (L) 0.2 - 1.2 mg/dL    Calcium 9.3 8.4 - 10.5 mg/dL    Total Protein 7.0 6.4 - 8.3 g/dL    Albumin 4.0 3.5 - 5.0 g/dL    Albumin/Globulin Ratio 1.3 1.1 - 2.6 ratio    Globulin 3.0 2.0 - 4.0 g/dL    Glomerular Filtration Rate >60.0 >60.0 mL/min/1.73 sq.m. Anion Gap 12.0 3.0 - 15.0 mmol/L   Hemoglobin A1C   Result Value Ref Range    Hemoglobin A1C 6.0 (H) 4.8 - 5.6 %    AVERAGE GLUCOSE 127 (H) 91 - 123 mg/dL        Physical Exam  Patient appears well, she is pleasant, alert, oriented x 3, in no distress. Morbidly obese  ENT normal.  Neck supple. No adenopathy or thyromegaly. KRISTINA. Lungs are clear, good air entry, no wheezes  Cardiovascular, S1 and S2 normal, no murmurs, regular rate and rhythm. Abdomen is soft without tenderness, guarding  /Anorectal, deferred. Muscleskeletal, no swelling  Extremities show no edema  Neurological is normal without focal findings. Skin: no concerning lesions. Psych: normal affect. Mood good. Oriented x 3.       Vitals:    02/28/23 1106 02/28/23 1112   BP: (!) 136/91 124/87   Site: Right Upper Arm Left Upper Arm   Position: Sitting Sitting   Cuff Size: Small Adult Small Adult   Pulse: 80    Resp: 16    Temp: 97.9 °F (36.6 °C)    TempSrc: Temporal    SpO2: 98%    Weight: 202 lb 6.4 oz (91.8 kg)    Height: 5' 2\" (1.575 m)             On this date 03/06/23  have spent 30 minutes reviewing previous notes, test results and face to face with the patient discussing the diagnosis and importance of compliance with the treatment plan as well as documenting on the day of the visit.       DEBBIE BoseC

## 2023-02-28 NOTE — Clinical Note
Patient's recent A1c was 6%, much higher than before. She can take the metformin 500 mg twice daily with meals.

## 2023-02-28 NOTE — PROGRESS NOTES
Kirsty Sanchez is a 41 y.o. presents today for   Chief Complaint   Patient presents with    Blood Sugar Problem    Nausea    Abdominal Pain    Anorexia    Tachycardia     Is someone accompanying this pt? no    Is the patient using any DME equipment during OV? no  Vitals:    02/28/23 1106   Resp: 16   Temp: 97.9 °F (36.6 °C)       Depression Screening:   PHQ-9 Questionaire 2/28/2023 1/25/2023 12/8/2022 10/14/2022 4/4/2022 12/11/2018   Little interest or pleasure in doing things 0 0 0 0 0 0   Feeling down, depressed, or hopeless 0 0 0 0 0 1   PHQ-9 Total Score 0 0 0 0 0 1        Abuse Screening:  No flowsheet data found. Learning Assessment Screening:   No question data found. Fall Risk Screening:   No flowsheet data found. Health Maintenance: reviewed and discussed and ordered per Provider. Health Maintenance Due   Topic Date Due    COVID-19 Vaccine (1) Never done    HIV screen  Never done    Hepatitis C screen  Never done    DTaP/Tdap/Td vaccine (1 - Tdap) Never done    Cervical cancer screen  Never done    Colorectal Cancer Screen  Never done    Flu vaccine (1) Never done         Coordination of Care:   1. \"Have you been to the ER, urgent care clinic since your last visit? Hospitalized since your last visit? \" no    2. \"Have you seen or consulted any other health care providers outside of the [address] [place] [address] since your last visit? \" no    3. For patients aged 39-70: Has the patient had a colonoscopy / FIT/ Cologuard? Yes - no Care Gap present  If the patient is female:    4. For patients aged 41-77: Has the patient had a mammogram within the past 2 years? Yes - no Care Gap present    5. For patients aged 21-65: Has the patient had a pap smear? Yes - no Care Gap present    Advanced Directive:  1. Do you have an Advanced Directive? No     2. Would you like information on Advanced Directives?  No    Ricardo Mendiola CMA

## 2023-03-01 LAB
A/G RATIO: 1.3 RATIO (ref 1.1–2.6)
ALBUMIN SERPL-MCNC: 4 G/DL (ref 3.5–5)
ALP BLD-CCNC: 69 U/L (ref 25–115)
ALT SERPL-CCNC: 13 U/L (ref 5–40)
ANION GAP SERPL CALCULATED.3IONS-SCNC: 12 MMOL/L (ref 3–15)
AST SERPL-CCNC: 18 U/L (ref 10–37)
AVERAGE GLUCOSE: 127 MG/DL (ref 91–123)
BACTERIA: NEGATIVE
BILIRUB SERPL-MCNC: 0.1 MG/DL (ref 0.2–1.2)
BILIRUB SERPL-MCNC: NEGATIVE MG/DL
BLOOD: NEGATIVE
BUN BLDV-MCNC: 8 MG/DL (ref 6–22)
CALCIUM SERPL-MCNC: 9.3 MG/DL (ref 8.4–10.5)
CHLORIDE BLD-SCNC: 107 MMOL/L (ref 98–110)
CLARITY: CLEAR
CO2: 19 MMOL/L (ref 20–32)
COLOR: YELLOW
CREAT SERPL-MCNC: 0.7 MG/DL (ref 0.5–1.2)
EPITHELIAL CELLS: ABNORMAL /HPF
GLOBULIN: 3 G/DL (ref 2–4)
GLOMERULAR FILTRATION RATE: >60 ML/MIN/1.73 SQ.M.
GLUCOSE: 97 MG/DL (ref 70–99)
GLUCOSE: NEGATIVE MG/DL
HBA1C MFR BLD: 6 % (ref 4.8–5.6)
HYALINE CASTS: ABNORMAL /LPF (ref 0–2)
KETONES, URINE: ABNORMAL MG/DL
LEUKOCYTE ESTERASE, URINE: NEGATIVE
NITRITE, URINE: NEGATIVE
PH, URINE: 6 PH (ref 5–8)
POTASSIUM SERPL-SCNC: 4.4 MMOL/L (ref 3.5–5.5)
PROTEIN UA: NEGATIVE MG/DL
RBC URINE: ABNORMAL /HPF
SODIUM BLD-SCNC: 138 MMOL/L (ref 133–145)
SPECIFIC GRAVITY: 1 (ref 1–1.03)
TOTAL PROTEIN: 7 G/DL (ref 6.4–8.3)
UROBILINOGEN: 0.2 MG/DL
WBC UA: ABNORMAL /HPF (ref 0–5)

## 2023-03-06 ENCOUNTER — TELEPHONE (OUTPATIENT)
Facility: CLINIC | Age: 48
End: 2023-03-06

## 2023-03-06 NOTE — TELEPHONE ENCOUNTER
----- Message from Nettie Reinaldo sent at 3/2/2023  1:57 PM EST -----  Subject: Referral Request    Reason for referral request? Pt was to speak with office about her   referral for the [facility] they never received the   referral information @ fax : [phone] pt is trying to scheduled a pp   for this referral   Provider patient wants to be referred to(if known):     Provider Phone Number(if known):     Additional Information for Provider?   ---------------------------------------------------------------------------  --------------  [id number] [employer]    [phone]; OK to leave message on voicemail  ---------------------------------------------------------------------------  --------------

## 2023-03-09 DIAGNOSIS — R73.03 PREDIABETES: Primary | ICD-10-CM

## 2023-03-09 RX ORDER — LANCETS 30 GAUGE
EACH MISCELLANEOUS
Qty: 100 EACH | Refills: 3 | Status: SHIPPED | OUTPATIENT
Start: 2023-03-09

## 2023-03-09 RX ORDER — LANCETS 30 GAUGE
EACH MISCELLANEOUS
COMMUNITY
Start: 2023-01-30 | End: 2023-03-09 | Stop reason: SDUPTHER

## 2023-03-30 ENCOUNTER — OFFICE VISIT (OUTPATIENT)
Facility: CLINIC | Age: 48
End: 2023-03-30
Payer: MEDICAID

## 2023-03-30 VITALS
DIASTOLIC BLOOD PRESSURE: 76 MMHG | WEIGHT: 201.2 LBS | TEMPERATURE: 97.7 F | HEART RATE: 77 BPM | RESPIRATION RATE: 16 BRPM | HEIGHT: 62 IN | BODY MASS INDEX: 37.02 KG/M2 | SYSTOLIC BLOOD PRESSURE: 115 MMHG | OXYGEN SATURATION: 97 %

## 2023-03-30 DIAGNOSIS — J02.0 STREP THROAT: ICD-10-CM

## 2023-03-30 DIAGNOSIS — B37.9 ANTIBIOTIC-INDUCED YEAST INFECTION: ICD-10-CM

## 2023-03-30 DIAGNOSIS — J32.9 RECURRENT SINUS INFECTIONS: ICD-10-CM

## 2023-03-30 DIAGNOSIS — T36.95XA ANTIBIOTIC-INDUCED YEAST INFECTION: ICD-10-CM

## 2023-03-30 DIAGNOSIS — Z63.79 STRESSFUL LIFE EVENTS AFFECTING FAMILY AND HOUSEHOLD: ICD-10-CM

## 2023-03-30 DIAGNOSIS — J02.9 SORE THROAT: Primary | ICD-10-CM

## 2023-03-30 DIAGNOSIS — R73.03 PREDIABETES: ICD-10-CM

## 2023-03-30 DIAGNOSIS — E78.2 MIXED HYPERLIPIDEMIA: ICD-10-CM

## 2023-03-30 DIAGNOSIS — F41.9 ANXIETY: ICD-10-CM

## 2023-03-30 LAB
GROUP A STREP ANTIGEN, POC: POSITIVE
VALID INTERNAL CONTROL, POC: YES

## 2023-03-30 PROCEDURE — 87880 STREP A ASSAY W/OPTIC: CPT | Performed by: NURSE PRACTITIONER

## 2023-03-30 PROCEDURE — 99214 OFFICE O/P EST MOD 30 MIN: CPT | Performed by: NURSE PRACTITIONER

## 2023-03-30 RX ORDER — PENICILLIN V POTASSIUM 500 MG/1
500 TABLET ORAL 2 TIMES DAILY
Qty: 20 TABLET | Refills: 0 | Status: SHIPPED | OUTPATIENT
Start: 2023-03-30 | End: 2023-04-09

## 2023-03-30 RX ORDER — DULOXETIN HYDROCHLORIDE 30 MG/1
30 CAPSULE, DELAYED RELEASE ORAL DAILY
Qty: 30 CAPSULE | Refills: 2 | Status: SHIPPED | OUTPATIENT
Start: 2023-03-30

## 2023-03-30 RX ORDER — PRAVASTATIN SODIUM 20 MG
20 TABLET ORAL DAILY
Qty: 90 TABLET | Refills: 3 | Status: SHIPPED | OUTPATIENT
Start: 2023-03-30

## 2023-03-30 RX ORDER — FLUCONAZOLE 150 MG/1
TABLET ORAL
Qty: 2 TABLET | Refills: 0 | Status: SHIPPED | OUTPATIENT
Start: 2023-03-30

## 2023-03-30 SDOH — ECONOMIC STABILITY: FOOD INSECURITY: WITHIN THE PAST 12 MONTHS, THE FOOD YOU BOUGHT JUST DIDN'T LAST AND YOU DIDN'T HAVE MONEY TO GET MORE.: NEVER TRUE

## 2023-03-30 SDOH — ECONOMIC STABILITY: INCOME INSECURITY: HOW HARD IS IT FOR YOU TO PAY FOR THE VERY BASICS LIKE FOOD, HOUSING, MEDICAL CARE, AND HEATING?: NOT HARD AT ALL

## 2023-03-30 SDOH — ECONOMIC STABILITY: FOOD INSECURITY: WITHIN THE PAST 12 MONTHS, YOU WORRIED THAT YOUR FOOD WOULD RUN OUT BEFORE YOU GOT MONEY TO BUY MORE.: NEVER TRUE

## 2023-03-30 ASSESSMENT — PATIENT HEALTH QUESTIONNAIRE - PHQ9
SUM OF ALL RESPONSES TO PHQ QUESTIONS 1-9: 0
SUM OF ALL RESPONSES TO PHQ9 QUESTIONS 1 & 2: 0
SUM OF ALL RESPONSES TO PHQ QUESTIONS 1-9: 0
SUM OF ALL RESPONSES TO PHQ QUESTIONS 1-9: 0
2. FEELING DOWN, DEPRESSED OR HOPELESS: 0
1. LITTLE INTEREST OR PLEASURE IN DOING THINGS: 0
SUM OF ALL RESPONSES TO PHQ QUESTIONS 1-9: 0

## 2023-03-30 NOTE — PROGRESS NOTES
Nila Hickey is a 57 y.o. presents today for No chief complaint on file. Is someone accompanying this pt? no    Is the patient using any DME equipment during OV? no  There were no vitals filed for this visit. Depression Screening:   PHQ-9 Questionaire 3/30/2023 2/28/2023 1/25/2023 12/8/2022 10/14/2022 4/4/2022 12/11/2018   Little interest or pleasure in doing things 0 0 0 0 0 0 0   Feeling down, depressed, or hopeless 0 0 0 0 0 0 1   PHQ-9 Total Score 0 0 0 0 0 0 1        Abuse Screening:  No flowsheet data found. Learning Assessment Screening:   No question data found. Fall Risk Screening:   No flowsheet data found. Health Maintenance: reviewed and discussed and ordered per Provider. Health Maintenance Due   Topic Date Due    COVID-19 Vaccine (1) Never done    HIV screen  Never done    Hepatitis C screen  Never done    DTaP/Tdap/Td vaccine (1 - Tdap) Never done    Colorectal Cancer Screen  Never done    Flu vaccine (1) Never done         Coordination of Care:   1. \"Have you been to the ER, urgent care clinic since your last visit? Hospitalized since your last visit? \" yes, 03/14/23    2. \"Have you seen or consulted any other health care providers outside of the [address] [place] [address] since your last visit? \" no    3. For patients aged 39-70: Has the patient had a colonoscopy / FIT/ Cologuard? No  If the patient is female:    4. For patients aged 41-77: Has the patient had a mammogram within the past 2 years? Yes - no Care Gap present    5. For patients aged 21-65: Has the patient had a pap smear? Yes - no Care Gap present    Advanced Directive:  1. Do you have an Advanced Directive? No     2. Would you like information on Advanced Directives?  No    Juan Patel CMA
Ar Automatic Reconciliation   phenol 1.4 % LIQD mouth spray Take 1 spray by mouth as needed 1/14/22   Ar Automatic Reconciliation      Results for orders placed or performed in visit on 03/30/23   AMB POC RAPID STREP A   Result Value Ref Range    Valid Internal Control, POC yes     Group A Strep Antigen, POC Positive Negative        Physical Exam  The patient appears well, is pleasant, alert, oriented x 3, in no distress. Eyes, conjunctiva normal, PERRLA, EOM intact  ENT normal.  Neck supple and normal ROM. No adenopathy, tenderness or thyromegaly. Head, normocephalic, atraumatic  Lungs are clear, good air entry, no wheezes, rhonchi or rales. S1 and S2 normal, no murmurs, regular rate and rhythm. No LAD. Abdomen is soft without tenderness, guarding, mass or organomegaly. Extremities show no edema  Neurological is normal without focal findings. Psych: Talkative. Mood good. Oriented x 3. Judgement and insight intact. Vitals:    03/30/23 1603   BP: 115/76   Site: Right Upper Arm   Position: Sitting   Cuff Size: Large Adult   Pulse: 77   Resp: 16   Temp: 97.7 °F (36.5 °C)   TempSrc: Temporal   SpO2: 97%   Weight: 201 lb 3.2 oz (91.3 kg)   Height: 5' 2\" (1.575 m)            On this date 03/30/23  have spent 28 minutes reviewing previous notes, test results and face to face with the patient discussing the diagnosis and importance of compliance with the treatment plan as well as documenting on the day of the visit.       Erlene Schilder, NP-C

## 2023-04-05 DIAGNOSIS — R05.1 ACUTE COUGH: Primary | ICD-10-CM

## 2023-04-05 RX ORDER — BENZONATATE 100 MG/1
100 CAPSULE ORAL 2 TIMES DAILY PRN
Qty: 14 CAPSULE | Refills: 0 | Status: SHIPPED | OUTPATIENT
Start: 2023-04-05 | End: 2023-04-12

## 2023-05-01 RX ORDER — BLOOD-GLUCOSE METER
EACH MISCELLANEOUS
COMMUNITY
Start: 2023-01-30

## 2023-05-03 ENCOUNTER — TELEMEDICINE (OUTPATIENT)
Facility: CLINIC | Age: 48
End: 2023-05-03
Payer: MEDICAID

## 2023-05-03 DIAGNOSIS — F41.9 ANXIETY: ICD-10-CM

## 2023-05-03 DIAGNOSIS — G89.29 CHRONIC CERVICAL PAIN: ICD-10-CM

## 2023-05-03 DIAGNOSIS — M79.7 FIBROMYALGIA: ICD-10-CM

## 2023-05-03 DIAGNOSIS — M54.2 CHRONIC CERVICAL PAIN: ICD-10-CM

## 2023-05-03 DIAGNOSIS — G89.29 CHRONIC MIDLINE THORACIC BACK PAIN: Primary | ICD-10-CM

## 2023-05-03 DIAGNOSIS — M54.6 CHRONIC MIDLINE THORACIC BACK PAIN: Primary | ICD-10-CM

## 2023-05-03 PROCEDURE — 99213 OFFICE O/P EST LOW 20 MIN: CPT | Performed by: NURSE PRACTITIONER

## 2023-05-03 NOTE — PROGRESS NOTES
Terry Marshall is a 57 y.o. female  established patient, here for evaluation of the following chief complaint(s):  Chief Complaint   Patient presents with    Back Pain     Chronic neck pain and thoracic pain, anxiety and fibromyalgia    Terry Marshall, was evaluated through a synchronous (real-time) audio encounter. The patient (or guardian if applicable) is aware that this is a billable service, which includes applicable co-pays. This Virtual Visit was conducted with patient's (and/or legal guardian's) consent. Patient identification was verified, and a caregiver was present when appropriate. The patient was located at Other: in her car  Provider was located at [place] [address]): [address] [facility], [address] [facility],  [address] [facility]    --PIPER Rivers Ra, CNP on 5/3/2023 at 5:15 PM    An electronic signature was used to authenticate this note. Assessment and Plan  1. Chronic midline thoracic back pain  -     [place] [address] [facility] [place] ([address] [facility])  2. Chronic cervical pain  3. Anxiety  4. Fibromyalgia     Return in about 1 month (around 6/3/2023) for Routine, 20.     [id number] hrs    HPI:   Audio visit. Pt sounds well. Patient reports she has chronic pain between her shoulder pain over to her spine. Patient reports that she has had \"degenerative spine disease\" with her spine which she thinks has been told in the past that she has a bulging disc in her lumbar spine and neck issues. Patient reports she was seen by Ortho at [facility] several years ago but she did not like her encounter and wants to be reevaluated. Patient reports she has fibromyalgia and she has taken Lyrica in the past.  Patient understands that Cymbalta can help with fibromyalgia and anxiety. Patient does not want to take both medications. Patient filled her newly prescribed Cymbalta but she has not taken it yet. She says she will try it tonight.     Patient scheduled for

## 2023-05-03 NOTE — PROGRESS NOTES
Jil Wiggins is a 57 y.o. female  {established vs new:41047::\"established\"} patient, here for evaluation of the following chief complaint(s):  No chief complaint on file. Jil Wiggins, was evaluated through a synchronous (real-time) audio-video encounter. The patient (or guardian if applicable) is aware that this is a billable service, which includes applicable co-pays. This Virtual Visit was conducted with patient's (and/or legal guardian's) consent. Patient identification was verified, and a caregiver was present when appropriate. The patient was located at {[employer] - Patient:[id number]}  Provider was located at {[employer] - Provider:[id number]}    --PIPER Owusu CNP on 5/3/2023 at 2:58 PM    An electronic signature was used to authenticate this note. Assessment and Plan  28     No follow-ups on file. HPI:   In office visit. ROS:    General: negative for - chills, fever, weight changes or malaise  HEENT: no sore throat, nasal congestion, vision problems or ear problems  Respiratory: no cough, shortness of breath, or wheezing  Cardiovascular: no chest pain, palpitations, or dyspnea on exertion  Gastrointestinal: no abdominal pain, N/V, change in bowel habits  Musculoskeletal: no back pain or joint pain  Neurological: no headache or dizziness  Endo:  No polyuria or polydipsia  : no urinary  Skin:   Psychological: negative for - anxiety, depression, sleeps issues    Prior to Admission medications    Medication Sig Start Date End Date Taking?  Authorizing Provider   Blood Glucose Monitoring Suppl (ONE TOUCH ULTRA 2) w/Device KIT USE TO CHECK BLOOD SUGAR AS DIRECTED 1/30/23   Historical Provider, MD   DULoxetine (CYMBALTA) 30 MG extended release capsule Take 1 capsule by mouth daily 3/30/23   Claudell Mass, APRN - CNP   fluconazole (DIFLUCAN) 150 MG tablet Take 1 tab by mouth while taking antibiotic and 1 after completion 3/30/23   Claudell Mass, APRN - CNP   pravastatin

## 2023-05-05 ENCOUNTER — TELEPHONE (OUTPATIENT)
Facility: CLINIC | Age: 48
End: 2023-05-05

## 2023-05-05 DIAGNOSIS — F41.9 ANXIETY: Primary | ICD-10-CM

## 2023-05-05 RX ORDER — FLUOXETINE 10 MG/1
10 CAPSULE ORAL DAILY
Qty: 30 CAPSULE | Refills: 1 | Status: SHIPPED | OUTPATIENT
Start: 2023-05-05

## 2023-05-05 NOTE — PROGRESS NOTES
Patient reports she did not like the way she felt on Cymbalta and she felt angry and verbally snapped at her kids. Patient reports she stopped the Cymbalta and she has agreed to a small dose of Prozac that she will take daily.

## 2023-05-08 ENCOUNTER — TELEPHONE (OUTPATIENT)
Facility: CLINIC | Age: 48
End: 2023-05-08

## 2023-05-08 ENCOUNTER — OFFICE VISIT (OUTPATIENT)
Facility: CLINIC | Age: 48
End: 2023-05-08

## 2023-05-08 VITALS
HEART RATE: 83 BPM | HEIGHT: 62 IN | BODY MASS INDEX: 36.07 KG/M2 | RESPIRATION RATE: 15 BRPM | WEIGHT: 196 LBS | OXYGEN SATURATION: 99 % | SYSTOLIC BLOOD PRESSURE: 127 MMHG | TEMPERATURE: 98.2 F | DIASTOLIC BLOOD PRESSURE: 81 MMHG

## 2023-05-08 DIAGNOSIS — F41.1 GAD (GENERALIZED ANXIETY DISORDER): Primary | ICD-10-CM

## 2023-05-08 DIAGNOSIS — K80.20 CALCULUS OF GALLBLADDER WITHOUT CHOLECYSTITIS WITHOUT OBSTRUCTION: ICD-10-CM

## 2023-05-08 DIAGNOSIS — F41.9 ANXIETY: Primary | ICD-10-CM

## 2023-05-08 DIAGNOSIS — F41.9 ANXIETY: ICD-10-CM

## 2023-05-08 RX ORDER — BUSPIRONE HYDROCHLORIDE 7.5 MG/1
7.5 TABLET ORAL 3 TIMES DAILY
Qty: 90 TABLET | Refills: 0 | Status: SHIPPED | OUTPATIENT
Start: 2023-05-08 | End: 2023-05-08 | Stop reason: SDUPTHER

## 2023-05-08 RX ORDER — QUETIAPINE FUMARATE 50 MG/1
50 TABLET, FILM COATED ORAL NIGHTLY
Qty: 90 TABLET | Refills: 1 | Status: SHIPPED | OUTPATIENT
Start: 2023-05-08

## 2023-05-08 RX ORDER — BUSPIRONE HYDROCHLORIDE 7.5 MG/1
7.5 TABLET ORAL 3 TIMES DAILY
Qty: 90 TABLET | Refills: 0 | Status: SHIPPED | OUTPATIENT
Start: 2023-05-08 | End: 2023-06-07

## 2023-05-08 RX ORDER — HYDROXYZINE HYDROCHLORIDE 25 MG/1
25 TABLET, FILM COATED ORAL EVERY 8 HOURS PRN
Qty: 30 TABLET | Refills: 0 | Status: SHIPPED | OUTPATIENT
Start: 2023-05-08 | End: 2023-05-18

## 2023-05-08 SDOH — ECONOMIC STABILITY: FOOD INSECURITY: WITHIN THE PAST 12 MONTHS, YOU WORRIED THAT YOUR FOOD WOULD RUN OUT BEFORE YOU GOT MONEY TO BUY MORE.: NEVER TRUE

## 2023-05-08 SDOH — ECONOMIC STABILITY: FOOD INSECURITY: WITHIN THE PAST 12 MONTHS, THE FOOD YOU BOUGHT JUST DIDN'T LAST AND YOU DIDN'T HAVE MONEY TO GET MORE.: NEVER TRUE

## 2023-05-08 SDOH — ECONOMIC STABILITY: INCOME INSECURITY: HOW HARD IS IT FOR YOU TO PAY FOR THE VERY BASICS LIKE FOOD, HOUSING, MEDICAL CARE, AND HEATING?: NOT HARD AT ALL

## 2023-05-08 ASSESSMENT — PATIENT HEALTH QUESTIONNAIRE - PHQ9
2. FEELING DOWN, DEPRESSED OR HOPELESS: 0
SUM OF ALL RESPONSES TO PHQ QUESTIONS 1-9: 0
SUM OF ALL RESPONSES TO PHQ9 QUESTIONS 1 & 2: 0
SUM OF ALL RESPONSES TO PHQ QUESTIONS 1-9: 0
1. LITTLE INTEREST OR PLEASURE IN DOING THINGS: 0
SUM OF ALL RESPONSES TO PHQ QUESTIONS 1-9: 0
SUM OF ALL RESPONSES TO PHQ QUESTIONS 1-9: 0

## 2023-05-16 ENCOUNTER — HOSPITAL ENCOUNTER (OUTPATIENT)
Dept: WOMENS IMAGING | Facility: HOSPITAL | Age: 48
Discharge: HOME | End: 2023-05-19
Payer: MEDICAID

## 2023-05-16 DIAGNOSIS — Z12.39 ENCOUNTER FOR BREAST CANCER SCREENING OTHER THAN MAMMOGRAM: ICD-10-CM

## 2023-05-16 PROCEDURE — 77067 SCR MAMMO BI INCL CAD: CPT

## 2023-05-17 DIAGNOSIS — N63.10 MASS OF RIGHT BREAST, UNSPECIFIED QUADRANT: Primary | ICD-10-CM

## 2023-05-18 PROBLEM — K80.20 CALCULUS OF GALLBLADDER WITHOUT CHOLECYSTITIS WITHOUT OBSTRUCTION: Status: ACTIVE | Noted: 2023-05-18

## 2023-05-18 PROBLEM — F41.1 GAD (GENERALIZED ANXIETY DISORDER): Status: ACTIVE | Noted: 2023-05-18

## 2023-05-18 NOTE — PROGRESS NOTES
Arie Rodriguez (: 1975) is a 57 y.o. female, established patient, here for evaluation of the following chief complaint(s):  ED Follow-up             Subjective:     HPI:  Patient presents for ED follow up. She was seen at emergency room on 2023 for palpitations and nausea. CT abdomen showed cholelithiasis, without cholecystitis. Cardiac work-up was negative. Patient has history of uncontrolled anxiety. She has been inconsistent with her medication administration in the past.  She reports episodes where she has not slept for days, has panic attacks while driving, and does not want to leave the home on most days. She describes panic attacks as chest tightness, palpitations, shortness of breath, feeling of doom. She denies any suicidal or homicidal ideation. She denies chest pain or dyspnea at this time.         Past Medical History:   Diagnosis Date    Anxiety     Lung nodule     PFO (patent foramen ovale)     s/p closure    Prediabetes        Past Surgical History:   Procedure Laterality Date    OTHER SURGICAL HISTORY      Closure of patent foramen ovale       Current Outpatient Medications   Medication Sig Dispense Refill    QUEtiapine (SEROQUEL) 50 MG tablet Take 1 tablet by mouth at bedtime 90 tablet 1    hydrOXYzine HCl (ATARAX) 25 MG tablet Take 1 tablet by mouth every 8 hours as needed for Itching 30 tablet 0    busPIRone (BUSPAR) 7.5 MG tablet Take 1 tablet by mouth 3 times daily 90 tablet 0    Blood Glucose Monitoring Suppl (ONE TOUCH ULTRA 2) w/Device KIT USE TO CHECK BLOOD SUGAR AS DIRECTED      pravastatin (PRAVACHOL) 20 MG tablet Take 1 tablet by mouth daily Take 20 mg by mouth 90 tablet 3    Lancets MISC Check your blood sugar once in the morning before you eat or when you are having symptoms of low blood sugar are high blood sugar 100 each 3    blood glucose test strips (ASCENSIA AUTODISC VI;ONE TOUCH ULTRA TEST VI) strip Check your blood sugar once in the morning before you eat or

## 2023-05-22 ENCOUNTER — TELEPHONE (OUTPATIENT)
Facility: CLINIC | Age: 48
End: 2023-05-22

## 2023-05-22 NOTE — TELEPHONE ENCOUNTER
----- Message from [facility] sent at 5/22/2023  8:18 AM EDT -----  Regarding: Test results   Contact: [phone]  Thank you but I need a MRI asap and was supposed to be seen by the office in 2 days! I have the CD FOR EVERYONE TO REVIEW! My back is killing me they found a lesion.

## 2023-05-23 ENCOUNTER — HOSPITAL ENCOUNTER (OUTPATIENT)
Facility: HOSPITAL | Age: 48
Discharge: HOME | End: 2023-05-26
Payer: MEDICAID

## 2023-05-23 ENCOUNTER — HOSPITAL ENCOUNTER (OUTPATIENT)
Dept: WOMENS IMAGING | Facility: HOSPITAL | Age: 48
Discharge: HOME | End: 2023-05-26
Payer: MEDICAID

## 2023-05-23 ENCOUNTER — OFFICE VISIT (OUTPATIENT)
Age: 48
End: 2023-05-23
Payer: MEDICAID

## 2023-05-23 ENCOUNTER — TELEPHONE (OUTPATIENT)
Facility: CLINIC | Age: 48
End: 2023-05-23

## 2023-05-23 ENCOUNTER — TELEPHONE (OUTPATIENT)
Age: 48
End: 2023-05-23

## 2023-05-23 VITALS — BODY MASS INDEX: 36.25 KG/M2 | HEIGHT: 62 IN | WEIGHT: 197 LBS

## 2023-05-23 DIAGNOSIS — N63.10 MASS OF RIGHT BREAST, UNSPECIFIED QUADRANT: ICD-10-CM

## 2023-05-23 DIAGNOSIS — M54.6 CHRONIC BILATERAL THORACIC BACK PAIN: Primary | ICD-10-CM

## 2023-05-23 DIAGNOSIS — G89.29 CHRONIC BILATERAL THORACIC BACK PAIN: Primary | ICD-10-CM

## 2023-05-23 DIAGNOSIS — E78.2 MIXED HYPERLIPIDEMIA: ICD-10-CM

## 2023-05-23 PROCEDURE — G0279 TOMOSYNTHESIS, MAMMO: HCPCS

## 2023-05-23 PROCEDURE — 99204 OFFICE O/P NEW MOD 45 MIN: CPT | Performed by: PHYSICAL MEDICINE & REHABILITATION

## 2023-05-23 PROCEDURE — 76642 ULTRASOUND BREAST LIMITED: CPT

## 2023-05-23 RX ORDER — ONDANSETRON 4 MG/1
4 TABLET, ORALLY DISINTEGRATING ORAL EVERY 8 HOURS PRN
COMMUNITY
Start: 2023-05-21 | End: 2023-05-28

## 2023-05-23 RX ORDER — ACETAMINOPHEN 500 MG
500 TABLET ORAL EVERY 6 HOURS PRN
COMMUNITY

## 2023-05-23 RX ORDER — MELOXICAM 7.5 MG/1
7.5 TABLET ORAL DAILY PRN
Qty: 30 TABLET | Refills: 0 | Status: SHIPPED | OUTPATIENT
Start: 2023-05-23 | End: 2023-06-22

## 2023-05-23 RX ORDER — PRAVASTATIN SODIUM 20 MG
20 TABLET ORAL DAILY
COMMUNITY

## 2023-05-23 RX ORDER — DIAZEPAM 2 MG/1
2 TABLET ORAL
Qty: 3 TABLET | Refills: 0 | Status: SHIPPED | OUTPATIENT
Start: 2023-05-23 | End: 2023-05-23

## 2023-05-23 RX ORDER — LIDOCAINE 50 MG/G
PATCH TOPICAL
COMMUNITY
Start: 2021-06-21

## 2023-05-23 RX ORDER — FAMOTIDINE 20 MG/1
20 TABLET, FILM COATED ORAL EVERY 12 HOURS
COMMUNITY
Start: 2023-03-07

## 2023-05-23 RX ORDER — MAGNESIUM 200 MG
200 TABLET ORAL DAILY
COMMUNITY

## 2023-05-23 ASSESSMENT — PATIENT HEALTH QUESTIONNAIRE - PHQ9
1. LITTLE INTEREST OR PLEASURE IN DOING THINGS: 0
SUM OF ALL RESPONSES TO PHQ9 QUESTIONS 1 & 2: 0
SUM OF ALL RESPONSES TO PHQ QUESTIONS 1-9: 0
2. FEELING DOWN, DEPRESSED OR HOPELESS: 0
SUM OF ALL RESPONSES TO PHQ QUESTIONS 1-9: 0

## 2023-05-23 NOTE — TELEPHONE ENCOUNTER
As patient's request, the order was electronically faxed to the [facility]. [facility]      An updated one can be faxed if needing too.     Patient stated the MRI was discussed to be with or without contrast.

## 2023-05-23 NOTE — PROGRESS NOTES
Oliver Staff presents today for   Chief Complaint   Patient presents with    Neck Pain       Is someone accompanying this pt? no    Is the patient using any DME equipment during OV? no    Depression Screening:  No flowsheet data found. Learning Assessment:  No flowsheet data found. Abuse Screening:  No flowsheet data found. Fall Risk  No flowsheet data found. OPIOID RISK TOOL  No flowsheet data found. Coordination of Care:  1. Have you been to the ER, urgent care clinic since your last visit? yes  05/21/23 for neck/back Hospitalized since your last visit? no    2. Have you seen or consulted any other health care providers outside of the [address] [place] [address] since your last visit? no Include any pap smears or colon screening.  no
female Body mass index is 36.03 kg/m². without any acute distress   Psychiatric: ?  Alert and oriented x 3 with normal mood    HEENT: ???????? Atraumatic   Respiratory:   Breathing non-labored and non dyspneic   CV: ???????????????? Peripheral pulses intact, no peripheral edema   Skin: ????????????? No rashes       Neurologic: ?? Sensation: normal and grossly intact thebilateral, upper extremity(s), lower extremity(s)   Strength: 5/5 in the bilateral, upper extremity(s), lower extremity(s)   Reflexes: reveals 2+ symmetric DTRs throughout   Gait: normal     Musculoskeletal: Cervical Exam /thoracic spine  Alignment: Normal  Atrophy: None     Tenderness to Palpation:   Cervical paraspinals: Positive  Cervical spinous processes: Negative  Upper thoracic paraspinals: Positive  Upper thoracic spinous processes: Negative  Upper trapezius:  Positive    Cervical ROM: Abnormal pain with cervical flexion  Shoulder ROM: No reproduction of pain with movement     Special Tests    Spurlings Maneuver: Negative            Assessment:   Thoracic back pain  Neck pain    Plan:      -Physical therapy -  discussed referral to PT will hold for further imaging  -Medications - will try short course of meloxicam 7.5mg with food x 10 days, not to take with other NSAIDS and can then take prn  . Counseled regarding side effects and appropriate administration of medications.    -Diagnostics/Imaging - MRI with and without  thoracic spine- to evaluate sclerotic lesion, CT images reviewed no fracture noted-rx for valium to take prior to MRI   -Injections - NA   -Lifestyle - Recommend regular exercise   -Education - The patient's diagnosis, prognosis and treatment options were discussed today. All questions were answered.     F/U - in after MRI        [address] [facility]

## 2023-05-23 NOTE — TELEPHONE ENCOUNTER
Patient is requesting a call back, has a few question about her MRI order, and would like for it to be sent to [facility] [address]

## 2023-05-23 NOTE — TELEPHONE ENCOUNTER
Called Pt to inform her that she has a ap at 1pm and I can't schedule the 12:30 VV. Advise Pt to see Ortho can order MRI for her. If ortho cannot to give us a call so we can get her on the schedule. Pt advise understanding and will call us back this afternoon.

## 2023-05-23 NOTE — PATIENT INSTRUCTIONS
[facility]    Please expect an automated call within 24-48 business hours to schedule your outpatient study with [facility]    If you have not received an automated call, please call [phone] to speak directly with a     [address] at [address]

## 2023-05-25 ENCOUNTER — TELEPHONE (OUTPATIENT)
Age: 48
End: 2023-05-25

## 2023-05-31 DIAGNOSIS — D64.9 ANEMIA, UNSPECIFIED TYPE: ICD-10-CM

## 2023-05-31 DIAGNOSIS — H93.8X2 POPPING OF LEFT EAR: ICD-10-CM

## 2023-05-31 DIAGNOSIS — H93.8X2 EAR PRESSURE, LEFT: Primary | ICD-10-CM

## 2023-05-31 RX ORDER — CETIRIZINE HYDROCHLORIDE 10 MG/1
10 TABLET ORAL DAILY
Qty: 30 TABLET | Refills: 0 | Status: SHIPPED | OUTPATIENT
Start: 2023-05-31 | End: 2023-06-30

## 2023-05-31 NOTE — PROGRESS NOTES
Pt c/o popping of left ear with intermittent pain. Pt denies fever or ear discharge. She has had blood work done with GYN related to anemia. She is worried. She is seeing GI for a EGD 6/14/2023 soon but cannot right now because she is wearing a heart monitor. Pt has heavy bleeding with her menstrual period.

## 2023-06-01 ENCOUNTER — TELEPHONE (OUTPATIENT)
Facility: CLINIC | Age: 48
End: 2023-06-01

## 2023-06-01 NOTE — TELEPHONE ENCOUNTER
Left ear severe pain up all night not allergies    Requesting antibiotic    Pharmacy: [facility]klin

## 2023-06-01 NOTE — TELEPHONE ENCOUNTER
Pt called [facility] reqesting Antibiotic for severe ear pain left ear. Touching back of ear causes pain and ear pops. Up all night long. Pt heard from [place] stating that zyrtec was put in. She states it's not allergies. Pt was last seen 5/23/2023. Pt states ear is killing her.     [place]  on [facility]

## 2023-06-07 ENCOUNTER — TELEPHONE (OUTPATIENT)
Facility: CLINIC | Age: 48
End: 2023-06-07

## 2023-06-07 LAB
FOLATE: 13.9 NG/ML
PATH REVIEW OF SMEAR: NORMAL
VITAMIN B-12: 624 PG/ML (ref 211–911)

## 2023-06-07 NOTE — TELEPHONE ENCOUNTER
----- Message from [address] [facility] Mariella Garcia sent at 6/7/2023  9:23 AM EDT -----  Regarding: Blood work  Contact: [phone]  Good Morning, So I have a appointment today with the blood doctor! But there are no results to give them ! I usually see them by now!  Can you contact them and contact me please and thank you

## 2023-06-07 NOTE — TELEPHONE ENCOUNTER
Called Pt and informed her that her lab results are not ready yet. Pt said she will call hematology to make sure they can still see her today.

## 2023-06-15 DIAGNOSIS — F41.1 GAD (GENERALIZED ANXIETY DISORDER): ICD-10-CM

## 2023-06-16 RX ORDER — HYDROXYZINE HYDROCHLORIDE 25 MG/1
25 TABLET, FILM COATED ORAL EVERY 8 HOURS PRN
Qty: 90 TABLET | Refills: 2 | Status: SHIPPED | OUTPATIENT
Start: 2023-06-16 | End: 2023-09-14

## 2023-06-27 DIAGNOSIS — G89.29 CHRONIC BILATERAL THORACIC BACK PAIN: ICD-10-CM

## 2023-06-27 DIAGNOSIS — M54.6 CHRONIC BILATERAL THORACIC BACK PAIN: ICD-10-CM

## 2023-06-28 ENCOUNTER — OFFICE VISIT (OUTPATIENT)
Facility: CLINIC | Age: 48
End: 2023-06-28
Payer: MEDICAID

## 2023-06-28 VITALS
HEIGHT: 62 IN | BODY MASS INDEX: 35.51 KG/M2 | TEMPERATURE: 97.4 F | OXYGEN SATURATION: 98 % | RESPIRATION RATE: 16 BRPM | WEIGHT: 193 LBS | DIASTOLIC BLOOD PRESSURE: 81 MMHG | HEART RATE: 83 BPM | SYSTOLIC BLOOD PRESSURE: 132 MMHG

## 2023-06-28 DIAGNOSIS — H93.12 EAR RINGING SOUND, LEFT: Primary | ICD-10-CM

## 2023-06-28 DIAGNOSIS — R73.03 PREDIABETES: ICD-10-CM

## 2023-06-28 DIAGNOSIS — F41.1 GAD (GENERALIZED ANXIETY DISORDER): ICD-10-CM

## 2023-06-28 DIAGNOSIS — E78.2 MIXED HYPERLIPIDEMIA: ICD-10-CM

## 2023-06-28 PROCEDURE — 99214 OFFICE O/P EST MOD 30 MIN: CPT | Performed by: NURSE PRACTITIONER

## 2023-06-28 RX ORDER — BUSPIRONE HYDROCHLORIDE 7.5 MG/1
7.5 TABLET ORAL 3 TIMES DAILY
Qty: 90 TABLET | Refills: 0
Start: 2023-06-28 | End: 2023-07-28

## 2023-07-13 ENCOUNTER — TELEPHONE (OUTPATIENT)
Age: 48
End: 2023-07-13

## 2023-07-27 NOTE — PROGRESS NOTES
A1c 5.7%, prediabetes. Total cholesterol high at 223, LDL high at 134 and good cholesterol HDL heart protective at 61. Metabolic panel unremarkable. TSH and T4 within normal limits. Vitamin D was low. CBC unremarkable.   Urinalysis was normal. ,DirectAddress_Unknown Negative

## 2023-07-31 ENCOUNTER — OFFICE VISIT (OUTPATIENT)
Age: 48
End: 2023-07-31
Payer: MEDICAID

## 2023-07-31 VITALS
BODY MASS INDEX: 35.33 KG/M2 | HEIGHT: 62 IN | SYSTOLIC BLOOD PRESSURE: 110 MMHG | OXYGEN SATURATION: 98 % | WEIGHT: 192 LBS | DIASTOLIC BLOOD PRESSURE: 73 MMHG | TEMPERATURE: 96.9 F | HEART RATE: 78 BPM

## 2023-07-31 DIAGNOSIS — G89.29 CHRONIC BILATERAL THORACIC BACK PAIN: Primary | ICD-10-CM

## 2023-07-31 DIAGNOSIS — M54.6 CHRONIC BILATERAL THORACIC BACK PAIN: Primary | ICD-10-CM

## 2023-07-31 DIAGNOSIS — M54.2 NECK PAIN: ICD-10-CM

## 2023-07-31 PROCEDURE — 99214 OFFICE O/P EST MOD 30 MIN: CPT | Performed by: PHYSICAL MEDICINE & REHABILITATION

## 2023-07-31 RX ORDER — NORELGESTROMIN AND ETHINYL ESTRADIOL 35; 150 UG/D; UG/D
PATCH TRANSDERMAL
COMMUNITY
Start: 2023-06-20

## 2023-07-31 RX ORDER — DOCUSATE SODIUM 100 MG/1
100 CAPSULE, LIQUID FILLED ORAL 2 TIMES DAILY
COMMUNITY
Start: 2023-07-27

## 2023-07-31 NOTE — PROGRESS NOTES
Jorge Smith presents today for   Chief Complaint   Patient presents with    Back Pain       Is someone accompanying this pt? no    Is the patient using any DME equipment during OV? no    Depression Screening:  PHQ-9 Questionaire 5/23/2023 5/8/2023 3/30/2023 2/28/2023 1/25/2023 12/8/2022 10/14/2022   Little interest or pleasure in doing things 0 0 0 0 0 0 0   Feeling down, depressed, or hopeless 0 0 0 0 0 0 0   PHQ-9 Total Score 0 0 0 0 0 0 0     PHQ Scores 5/23/2023 5/8/2023 3/30/2023 2/28/2023 1/25/2023 12/8/2022 10/14/2022   PHQ2 Score 0 0 0 0 0 0 0   PHQ2 Score - - - - - - -   PHQ9 Score 0 0 0 0 0 0 0         Abuse Screening: AMB Abuse Screening 5/8/2023   Do you ever feel afraid of your partner? N   Are you in a relationship with someone who physically or mentally threatens you? N   Is it safe for you to go home? Y         Coordination of Care:  1. Have you been to the ER, urgent care clinic since your last visit? Yes, pt went to the ER last week because of her neck pain  Hospitalized since your last visit? No, pt had an upper endoscopy done    2. Have you seen or consulted any other health care providers outside of the [address] [place] [address] since your last visit? Yes, hematology/oncology, pcp, GI Include any pap smears or colon screening.  no
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
## 2023-08-01 RX ORDER — MELOXICAM 7.5 MG/1
7.5 TABLET ORAL DAILY PRN
Qty: 30 TABLET | Refills: 0 | OUTPATIENT
Start: 2023-08-01 | End: 2023-08-31

## 2023-08-05 DIAGNOSIS — F41.1 GAD (GENERALIZED ANXIETY DISORDER): ICD-10-CM

## 2023-08-09 NOTE — TELEPHONE ENCOUNTER
Medication(s) requesting:   Requested Prescriptions     Pending Prescriptions Disp Refills    QUEtiapine (SEROQUEL) 50 MG tablet [Pharmacy Med Name: QUETIAPINE 50MG  TABLETS] 90 tablet 1     Sig: TAKE 1 TABLET BY MOUTH AT BEDTIME       Last office visit:  06/28/2023  Next office visit DMA: 8/14/2023  FUTURE APPT:   Future Appointments   Date Time Provider [address]   8/14/2023  4:00 PM PIPER Galaviz - CNP DMA BS AMB   10/10/2023  3:30 PM Kristen Torres MD [facility] BS AMB

## 2023-08-10 RX ORDER — QUETIAPINE FUMARATE 50 MG/1
50 TABLET, FILM COATED ORAL NIGHTLY
Qty: 30 TABLET | Refills: 0 | Status: SHIPPED | OUTPATIENT
Start: 2023-08-10

## 2023-08-14 ENCOUNTER — TELEMEDICINE (OUTPATIENT)
Facility: CLINIC | Age: 48
End: 2023-08-14
Payer: MEDICAID

## 2023-08-14 DIAGNOSIS — M47.812 CERVICAL SPINE ARTHRITIS WITH NERVE PAIN: ICD-10-CM

## 2023-08-14 DIAGNOSIS — R73.03 PREDIABETES: ICD-10-CM

## 2023-08-14 DIAGNOSIS — B34.9 HEADACHE DUE TO VIRAL INFECTION: Primary | ICD-10-CM

## 2023-08-14 DIAGNOSIS — M79.2 CERVICAL SPINE ARTHRITIS WITH NERVE PAIN: ICD-10-CM

## 2023-08-14 DIAGNOSIS — J02.9 SORE THROAT: ICD-10-CM

## 2023-08-14 DIAGNOSIS — R51.9 HEADACHE DUE TO VIRAL INFECTION: Primary | ICD-10-CM

## 2023-08-14 DIAGNOSIS — J06.9 VIRAL URI: ICD-10-CM

## 2023-08-14 PROCEDURE — 99214 OFFICE O/P EST MOD 30 MIN: CPT | Performed by: NURSE PRACTITIONER

## 2023-08-14 RX ORDER — PREGABALIN 50 MG/1
50 CAPSULE ORAL 3 TIMES DAILY
Qty: 90 CAPSULE | Refills: 3 | Status: SHIPPED | OUTPATIENT
Start: 2023-08-14 | End: 2023-12-12

## 2023-08-14 RX ORDER — LANCETS 30 GAUGE
1 EACH MISCELLANEOUS DAILY
Qty: 100 EACH | Refills: 3 | Status: SHIPPED | OUTPATIENT
Start: 2023-08-14

## 2023-08-14 NOTE — PROGRESS NOTES
Shonna Aase is a 57 y.o. female  established patient, here for evaluation of the following chief complaint(s):  Chief Complaint   Patient presents with    Follow-up     Nerve neck pain, sore throat, body aches       Shonna Aase, was evaluated through a synchronous (real-time) audio-video encounter. The patient (or guardian if applicable) is aware that this is a billable service, which includes applicable co-pays. This Virtual Visit was conducted with patient's (and/or legal guardian's) consent. Patient identification was verified, and a caregiver was present when appropriate. The patient was located at Home: [facility] [address] [address]  Provider was located at Facility (Appt Dept): [facility] [place],  [address] [place] [address]    --[facility] APR - CNP on 8/14/2023 at [address] was used to authenticate this note. Assessment and Plan  1. Headache due to viral infection  2. Sore throat  3. Cervical spine arthritis with nerve pain  -     pregabalin (LYRICA) 50 MG capsule; Take 1 capsule by mouth 3 times daily for 120 days. Max Daily Amount: 150 mg, Disp-90 capsule, R-3Normal  4. Prediabetes  -     Lancets MISC; DAILY Starting Mon 8/14/2023, Disp-100 each, R-3, Normal  5. Viral URI     Return in about 2 months (around 10/14/2023), or if symptoms worsen or fail to improve, for Routine, 20. HPI:   Virtual visit. Pt says she went to the ED this morning for a sore throat, fever and body aches. She was tested for covid, negative and wasn't \"swabbed for strep. \" She was given \"three tablets of a steroid in ED to take. \" Pt was told she has a upper respiratory virus. Pt needs a work note. Reviewed ED note. Pt believes she has nerve pain, told by ortho she should try gabapentin or Lyrica. Pt was ordered physical therapy. Pt has shooting and burning pain in her upper back and neck. She believes this pain is related to a lesion of her spine and neck arthritis.

## 2023-08-18 ENCOUNTER — CLINICAL DOCUMENTATION (OUTPATIENT)
Facility: CLINIC | Age: 48
End: 2023-08-18

## 2023-08-18 NOTE — PROGRESS NOTES
Pt reports she went to urgent care, tested negative for strep but had \"patchy white spots of throat\" and ordered amoxicillin.

## 2024-10-31 ENCOUNTER — OFFICE VISIT (OUTPATIENT)
Facility: CLINIC | Age: 49
End: 2024-10-31
Payer: MEDICAID

## 2024-10-31 VITALS
WEIGHT: 205.4 LBS | OXYGEN SATURATION: 96 % | TEMPERATURE: 98.2 F | SYSTOLIC BLOOD PRESSURE: 115 MMHG | HEIGHT: 62 IN | HEART RATE: 82 BPM | BODY MASS INDEX: 37.8 KG/M2 | RESPIRATION RATE: 18 BRPM | DIASTOLIC BLOOD PRESSURE: 74 MMHG

## 2024-10-31 DIAGNOSIS — J02.9 SORE THROAT: ICD-10-CM

## 2024-10-31 DIAGNOSIS — M54.50 CHRONIC BILATERAL LOW BACK PAIN WITHOUT SCIATICA: ICD-10-CM

## 2024-10-31 DIAGNOSIS — M25.551 BILATERAL HIP PAIN: ICD-10-CM

## 2024-10-31 DIAGNOSIS — R73.03 PREDIABETES: ICD-10-CM

## 2024-10-31 DIAGNOSIS — Z01.818 PRE-OP EXAM: Primary | ICD-10-CM

## 2024-10-31 DIAGNOSIS — F41.9 ANXIETY: ICD-10-CM

## 2024-10-31 DIAGNOSIS — M25.552 BILATERAL HIP PAIN: ICD-10-CM

## 2024-10-31 DIAGNOSIS — G89.29 CHRONIC BILATERAL LOW BACK PAIN WITHOUT SCIATICA: ICD-10-CM

## 2024-10-31 LAB
GROUP A STREP ANTIGEN, POC: NEGATIVE
HBA1C MFR BLD: 5.5 %
VALID INTERNAL CONTROL, POC: YES

## 2024-10-31 PROCEDURE — 83036 HEMOGLOBIN GLYCOSYLATED A1C: CPT | Performed by: NURSE PRACTITIONER

## 2024-10-31 PROCEDURE — 99215 OFFICE O/P EST HI 40 MIN: CPT | Performed by: NURSE PRACTITIONER

## 2024-10-31 PROCEDURE — 87880 STREP A ASSAY W/OPTIC: CPT | Performed by: NURSE PRACTITIONER

## 2024-10-31 PROCEDURE — 93000 ELECTROCARDIOGRAM COMPLETE: CPT | Performed by: NURSE PRACTITIONER

## 2024-10-31 SDOH — ECONOMIC STABILITY: FOOD INSECURITY: WITHIN THE PAST 12 MONTHS, THE FOOD YOU BOUGHT JUST DIDN'T LAST AND YOU DIDN'T HAVE MONEY TO GET MORE.: NEVER TRUE

## 2024-10-31 SDOH — ECONOMIC STABILITY: FOOD INSECURITY: WITHIN THE PAST 12 MONTHS, YOU WORRIED THAT YOUR FOOD WOULD RUN OUT BEFORE YOU GOT MONEY TO BUY MORE.: NEVER TRUE

## 2024-10-31 SDOH — ECONOMIC STABILITY: INCOME INSECURITY: HOW HARD IS IT FOR YOU TO PAY FOR THE VERY BASICS LIKE FOOD, HOUSING, MEDICAL CARE, AND HEATING?: NOT VERY HARD

## 2024-10-31 ASSESSMENT — PATIENT HEALTH QUESTIONNAIRE - PHQ9
SUM OF ALL RESPONSES TO PHQ QUESTIONS 1-9: 0
1. LITTLE INTEREST OR PLEASURE IN DOING THINGS: NOT AT ALL
SUM OF ALL RESPONSES TO PHQ QUESTIONS 1-9: 0
2. FEELING DOWN, DEPRESSED OR HOPELESS: NOT AT ALL
SUM OF ALL RESPONSES TO PHQ9 QUESTIONS 1 & 2: 0

## 2024-10-31 NOTE — PROGRESS NOTES
Vadim Hernandez is a 49 y.o. female  established patient, here for evaluation of the following chief complaint(s):  Chief Complaint   Patient presents with    Pre-op Exam     UTERINE ABLATION    Pharyngitis    Follow-up    Other     [facility] PAPERWORK      Assessment and Plan  1. Pre-op exam  Comments:  EKG  Orders:  -     EKG 12 Lead  2. Prediabetes  -     AMB POC HEMOGLOBIN A1C  3. Anxiety  4. Sore throat  -     AMB POC RAPID STREP A  -     External Referral To ENT  5. Bilateral hip pain  -     [facility]  6. Chronic bilateral low back pain without sciatica  -     [facility]       Return in about 2 weeks (around 11/14/2024) for finish pre op, 15.   HPI:   In office visit for preop exam. Pt needs [facility] paperwork completed twice for different reasons.    Pt is having HYSTEROSCOPY WITH ENDOMETRIAL ABLATION w/ GYN 11/21/2024. Pt may be out of work for 3 weeks. But she may try to work from home a week after her surgery.  Patient was ordered blood work by GYN but has not had it done yet.  10/31/2024 EKG  Sinus Rhythm   WITHIN NORMAL LIMITS      Pt report she has \"floaters.\" She has cataract surgery on both eye. She sees her eye specialist  1/2025.  Denies any eye pain or vision changes.    Pt reports she has bilateral hip and back pain that makes her toss and turn in bed. Advised OTC the Tylenol if needed.    Pt report she spit up something on her way here today. She has had sore throat since Tuesday. Strep negative. She also went to urgent care and was strep negative.  See picture below.  Patient appears well and in no acute distress.  Denies any breathing problems.      ROS:    General: negative for - chills, fever, weight changes or malaise  HEENT: no sore throat, nasal congestion, vision problems or ear problems  Respiratory: no cough, shortness of breath, or wheezing  Cardiovascular: no chest pain, palpitations, or

## 2024-11-07 ENCOUNTER — TELEPHONE (OUTPATIENT)
Facility: CLINIC | Age: 49
End: 2024-11-07

## 2024-11-07 NOTE — TELEPHONE ENCOUNTER
Patient is calling to get an update on the [facility] paperwork.    Her last office visit was on 10/31/2024.    Her employer has not received the paperwork     Patient states provider has all the paperwork,      Please advise    Thank you

## 2024-12-11 ENCOUNTER — OFFICE VISIT (OUTPATIENT)
Age: 49
End: 2024-12-11
Payer: MEDICAID

## 2024-12-11 ENCOUNTER — TELEPHONE (OUTPATIENT)
Age: 49
End: 2024-12-11

## 2024-12-11 VITALS — BODY MASS INDEX: 37.73 KG/M2 | TEMPERATURE: 96.9 F | WEIGHT: 205 LBS | HEIGHT: 62 IN

## 2024-12-11 DIAGNOSIS — M23.8X1 CREPITUS OF JOINT OF RIGHT KNEE: ICD-10-CM

## 2024-12-11 DIAGNOSIS — M17.11 ARTHRITIS OF KNEE, RIGHT: ICD-10-CM

## 2024-12-11 DIAGNOSIS — M25.561 ACUTE PAIN OF RIGHT KNEE: Primary | ICD-10-CM

## 2024-12-11 DIAGNOSIS — M25.661 DECREASED RANGE OF MOTION (ROM) OF RIGHT KNEE: ICD-10-CM

## 2024-12-11 PROCEDURE — 73562 X-RAY EXAM OF KNEE 3: CPT | Performed by: PHYSICIAN ASSISTANT

## 2024-12-11 PROCEDURE — 99204 OFFICE O/P NEW MOD 45 MIN: CPT | Performed by: PHYSICIAN ASSISTANT

## 2024-12-11 NOTE — PROGRESS NOTES
[facility] AMBULATORY OFFICE NOTE    Patient: Vadim Hernandez                MRN: [id number]       SSN: [id number]  YOB: 1975        AGE: 49 y.o.        SEX: female      OFFICE NOTE DICTATED    PCP: Denisa Hernandez APRN - CNP  12/11/24    Chief Complaint   Patient presents with    Knee Pain     Right         HISTORY:    Vadim Hernandez is a 49 y.o. female presenting with a complaint of right knee pain which limits her ability to stand from a sitting position and sit from a standing position.  Pain is dull aching characteristic and has failed to respond to over-the-counter analgesic/anti-inflammatory medication of.  No history of trauma.        Lab Results   Component Value Date/Time    [id number] 5.5 10/31/2024 03:00 PM         12/11/2024     9:08 AM 10/31/2024     2:26 PM 5/31/2024     3:55 PM 3/29/2024     3:28 PM 11/30/2023     3:02 PM 10/10/2023     3:55 PM 7/31/2023     1:51 PM   Weight Metrics   Weight 205 lb 205 lb 6.4 oz 195 lb 190 lb 194 lb 3.2 oz 193 lb 192 lb   BMI (Calculated) 38.2 kg/m2 37.6 kg/m2 35.7 kg/m2 0 kg/m2 35.6 kg/m2 35.4 kg/m2 35.2 kg/m2          Problem List Items Addressed This Visit    None  Visit Diagnoses       Acute pain of right knee    -  Primary    Relevant Orders    [92427] Knee 3V (Completed)    DME Order for (Specify) as OP    Arthritis of knee, right        Decreased range of motion (ROM) of right knee        Crepitus of joint of right knee        BMI 38.0-38.9,adult                PAST MEDICAL HISTORY:       Diagnosis Date    Anxiety     Lung nodule     PFO (patent foramen ovale)     s/p closure    Prediabetes         PAST SURGICAL HISTORY:       Procedure Laterality Date    OTHER SURGICAL HISTORY      Closure of patent foramen ovale        ALLERGIES:   Allergies   Allergen Reactions    Morphine Anaphylaxis     Pt confirms, does not have an epi pen prescribed         CURRENT MEDICATIONS:  A list of medications prior to the time of

## 2024-12-11 NOTE — TELEPHONE ENCOUNTER
Patient stated that she does not use the pharmacy where her medication Voltaren went to,Please send a new RX to   [place] [id number] [phone]    Please advise patient @ [phone]    Pharmacy has been updated in patient's chart

## 2024-12-12 NOTE — TELEPHONE ENCOUNTER
Patient called to follow up on previous message sent. Patient is asking for the diclofenac rx to be sent to [facility] [address].    Patient can be reached at [phone].

## 2024-12-27 ENCOUNTER — TELEPHONE (OUTPATIENT)
Facility: HOSPITAL | Age: 49
End: 2024-12-27

## 2024-12-27 NOTE — TELEPHONE ENCOUNTER
Called pt to remind her of upcoming eval and to check in with us at 3:10 to fill out ppw. Asked her to bring insurance card & ID.

## 2025-01-09 ENCOUNTER — TELEPHONE (OUTPATIENT)
Facility: HOSPITAL | Age: 50
End: 2025-01-09

## 2025-01-09 ENCOUNTER — OFFICE VISIT (OUTPATIENT)
Age: 50
End: 2025-01-09

## 2025-01-09 VITALS — HEIGHT: 62 IN | BODY MASS INDEX: 37.54 KG/M2 | WEIGHT: 204 LBS

## 2025-01-09 DIAGNOSIS — M25.551 RIGHT HIP PAIN: Primary | ICD-10-CM

## 2025-01-09 RX ORDER — TRIAMCINOLONE ACETONIDE 40 MG/ML
80 INJECTION, SUSPENSION INTRA-ARTICULAR; INTRAMUSCULAR ONCE
Status: COMPLETED | OUTPATIENT
Start: 2025-01-09 | End: 2025-01-09

## 2025-01-09 RX ORDER — DICLOFENAC SODIUM 75 MG/1
75 TABLET, DELAYED RELEASE ORAL 2 TIMES DAILY
Qty: 60 TABLET | Refills: 3 | Status: SHIPPED | OUTPATIENT
Start: 2025-01-09

## 2025-01-09 RX ORDER — BUPIVACAINE HYDROCHLORIDE 5 MG/ML
7 INJECTION, SOLUTION PERINEURAL ONCE
Status: COMPLETED | OUTPATIENT
Start: 2025-01-09 | End: 2025-01-09

## 2025-01-09 RX ADMIN — TRIAMCINOLONE ACETONIDE 80 MG: 40 INJECTION, SUSPENSION INTRA-ARTICULAR; INTRAMUSCULAR at 11:46

## 2025-01-09 RX ADMIN — BUPIVACAINE HYDROCHLORIDE 35 MG: 5 INJECTION, SOLUTION PERINEURAL at 11:46

## 2025-01-09 NOTE — TELEPHONE ENCOUNTER
I called  to remind her of her upcoming appointment. She did not answer, so I left her a message with the reminder of her appointment date and time

## 2025-01-09 NOTE — PROGRESS NOTES
bladder habits. No saddle paresthesia / anesthesia. Pt denies fever, unplanned weight loss / weight gains, and no skin changes.    Musculoskeletal pain per HPI.  Pain is exacerbated positionally.     PHYSICAL EXAM:    Ht 1.562 m (5' 1.5\")   Wt 92.5 kg (204 lb)   BMI 37.92 kg/m²     Constitutional: Appears well-developed and well-nourished. No distress. Sitting comfortably in the exam room, interacting with conversation with pleasant affect.  Gait appears steady and patient exhibits no evidence of ataxia. Patient is able to ambulate with caution. No focal neurological deficit noted. No facial droop, slurred speech, or evidence of altered mentation noted on exam.   Skin: Skin over the head, neck, bilateral limbs, and trunk is warm and dry. No rash or erythema noted.   Cranial nerves:      2:  Visual fields are full.  Visual Acuity is good for reading and TV viewing. Pupils are equal and pupillary responses are normal.   3, 4, 6:  Extraocular muscles are intact.    5:  Sensation and jaw opening are normal.   7:  Facial strength is intact.     8:  Hearing is intact.   9:  Palate elevates symmetrically.  10:  speech scanning.  11:  Sternocleidomastoid and trapezius strength intact.  12:  Tongue protrudes on the midline.  There are no fasciculations of the tongue.     HENT: NC/AT. Normal symmetry, bulk and tone of facial and neck musculature. Trachea midline.No discernible thyromegaly or masses. No involuntary movements.   Lymphatic: No preauricular, submandibuar, anterior or posterior cervical lymphadenopathy.   Psychiatric: The patient is awake, alert, and oriented to person, place and time. Behavior is normal. Thought content normal.   Cardiovascular: No clubbing, cyanosis.  No edema bilateral lower extremities.   Pulmonary: No tripoding nor accessory muscle recruitment. Breathing normally, no distress, no audible wheezing.     Distal cap refill intact at 2/2 Ian UE / LE.  Neuro intact Ian UE/LE to noxious

## 2025-01-10 ENCOUNTER — TELEPHONE (OUTPATIENT)
Age: 50
End: 2025-01-10

## 2025-01-10 NOTE — TELEPHONE ENCOUNTER
Pt called stating she had a cortisone injection on her right hip yesterday, 1/9, and now pt's face is red, hot and flushed.    Please contact pt and advise @ [phone]

## 2025-01-13 NOTE — TELEPHONE ENCOUNTER
1/13/25 Patient was called to assess her symptoms. No answer. Left a message to get in contact with us. Also will try her again later.

## 2025-01-14 NOTE — TELEPHONE ENCOUNTER
1/14/25 Patient was called. She is feeling better. She did have flushing from the steroid. She does have fibromyalgia and thinks that was due to having it. She said her knee is feeling better also. Patient mentioned she will reschedule her visit until later since she is feeling better with the knee and hip.

## 2025-01-23 ENCOUNTER — TELEMEDICINE (OUTPATIENT)
Facility: CLINIC | Age: 50
End: 2025-01-23
Payer: MEDICAID

## 2025-01-23 DIAGNOSIS — J40 BRONCHITIS: Primary | ICD-10-CM

## 2025-01-23 PROCEDURE — 99213 OFFICE O/P EST LOW 20 MIN: CPT | Performed by: NURSE PRACTITIONER

## 2025-01-23 RX ORDER — AZITHROMYCIN 250 MG/1
TABLET, FILM COATED ORAL
Qty: 6 TABLET | Refills: 0 | Status: SHIPPED | OUTPATIENT
Start: 2025-01-23 | End: 2025-02-02

## 2025-01-23 NOTE — PROGRESS NOTES
Vadim Hernandez is a 49 y.o. female  established patient, here for evaluation of the following chief complaint(s):  Chief Complaint   Patient presents with    Influenza     Bronchitis, ED and urgent care follow      Vadim Hernandez, was evaluated through a synchronous (real-time) audio-video encounter. The patient (or guardian if applicable) is aware that this is a billable service, which includes applicable co-pays. This Virtual Visit was conducted with patient's (and/or legal guardian's) consent. Patient identification was verified, and a caregiver was present when appropriate.   The patient was located at Home: [address] [place] [id number]  Provider was located at Facility (Appt Dept): [address] [place] [address], [facility] [address]  [place] [address]  Confirm you are appropriately licensed, registered, or certified to deliver care in the state where the patient is located as indicated above. If you are not or unsure, please re-schedule the visit: Yes, I confirm.      --PERLA FIGUEROA, PIPER - CNP on 1/23/2025 at 2:11 PM    An electronic signature was used to authenticate this note.   Assessment and Plan  1. Bronchitis  -     azithromycin (ZITHROMAX) 250 MG tablet; 500mg on day 1 followed by 250mg on days 2 - 5, Disp-6 tablet, R-0Normal       No follow-ups on file.   HPI:   Virtual visit.    Patient went to urgent care recently diagnosed with bronchitis Thursday, 1/16/2025. She was not feeling better then went to the ED Sunday, 1/19/2025 and was diagnosed with flu A.  She feels fatigued and has a nasty cough.  She was given Mucinex and Tessalon Perles. She still feeling bad and needs a note for work.  Patient reports she was not given Tamiflu.  One of her kids has the flu and is on Tamiflu  ROS:    General: negative for - chills, fever, weight changes or malaise  HEENT: no sore throat, nasal congestion, vision problems or ear problems  Respiratory: no cough, shortness of breath, or wheezing  Cardiovascular: no chest

## 2025-01-28 DIAGNOSIS — B37.9 ANTIBIOTIC-INDUCED YEAST INFECTION: Primary | ICD-10-CM

## 2025-01-28 DIAGNOSIS — T36.95XA ANTIBIOTIC-INDUCED YEAST INFECTION: Primary | ICD-10-CM

## 2025-01-28 RX ORDER — FLUCONAZOLE 100 MG/1
100 TABLET ORAL ONCE
Qty: 1 TABLET | Refills: 0 | Status: SHIPPED | OUTPATIENT
Start: 2025-01-28 | End: 2025-01-28

## 2025-04-29 ENCOUNTER — HOSPITAL ENCOUNTER (OUTPATIENT)
Facility: HOSPITAL | Age: 50
Setting detail: SPECIMEN
Discharge: HOME | End: 2025-05-02

## 2025-04-29 ENCOUNTER — OFFICE VISIT (OUTPATIENT)
Facility: CLINIC | Age: 50
End: 2025-04-29
Payer: MEDICAID

## 2025-04-29 VITALS
BODY MASS INDEX: 37.95 KG/M2 | SYSTOLIC BLOOD PRESSURE: 115 MMHG | RESPIRATION RATE: 16 BRPM | OXYGEN SATURATION: 98 % | HEIGHT: 61 IN | DIASTOLIC BLOOD PRESSURE: 83 MMHG | WEIGHT: 201 LBS | HEART RATE: 63 BPM | TEMPERATURE: 97.2 F

## 2025-04-29 DIAGNOSIS — Z11.3 SCREEN FOR STD (SEXUALLY TRANSMITTED DISEASE): ICD-10-CM

## 2025-04-29 DIAGNOSIS — G89.29 CHRONIC PAIN OF RIGHT KNEE: ICD-10-CM

## 2025-04-29 DIAGNOSIS — F41.9 ANXIETY: ICD-10-CM

## 2025-04-29 DIAGNOSIS — Z00.00 ANNUAL PHYSICAL EXAM: ICD-10-CM

## 2025-04-29 DIAGNOSIS — Z13.1 SCREENING FOR DIABETES MELLITUS (DM): ICD-10-CM

## 2025-04-29 DIAGNOSIS — Z13.228 SCREENING FOR METABOLIC DISORDER: ICD-10-CM

## 2025-04-29 DIAGNOSIS — E78.00 ELEVATED LDL CHOLESTEROL LEVEL: ICD-10-CM

## 2025-04-29 DIAGNOSIS — Z13.220 SCREENING FOR CHOLESTEROL LEVEL: ICD-10-CM

## 2025-04-29 DIAGNOSIS — M25.551 RIGHT HIP PAIN: ICD-10-CM

## 2025-04-29 DIAGNOSIS — Z13.29 SCREENING FOR THYROID DISORDER: ICD-10-CM

## 2025-04-29 DIAGNOSIS — Z13.0 SCREENING FOR DEFICIENCY ANEMIA: ICD-10-CM

## 2025-04-29 DIAGNOSIS — R73.03 PREDIABETES: Primary | ICD-10-CM

## 2025-04-29 DIAGNOSIS — M25.561 CHRONIC PAIN OF RIGHT KNEE: ICD-10-CM

## 2025-04-29 DIAGNOSIS — Z13.89 SCREENING FOR BLOOD OR PROTEIN IN URINE: ICD-10-CM

## 2025-04-29 PROCEDURE — 99396 PREV VISIT EST AGE 40-64: CPT | Performed by: NURSE PRACTITIONER

## 2025-04-29 PROCEDURE — 99001 SPECIMEN HANDLING PT-LAB: CPT

## 2025-04-29 RX ORDER — PRAVASTATIN SODIUM 20 MG
20 TABLET ORAL DAILY
Qty: 30 TABLET | Status: CANCELLED | OUTPATIENT
Start: 2025-04-29

## 2025-04-29 RX ORDER — HYDROXYZINE HYDROCHLORIDE 25 MG/1
25 TABLET, FILM COATED ORAL NIGHTLY PRN
Qty: 90 TABLET | Refills: 2 | Status: SHIPPED | OUTPATIENT
Start: 2025-04-29

## 2025-04-29 SDOH — ECONOMIC STABILITY: FOOD INSECURITY: WITHIN THE PAST 12 MONTHS, THE FOOD YOU BOUGHT JUST DIDN'T LAST AND YOU DIDN'T HAVE MONEY TO GET MORE.: NEVER TRUE

## 2025-04-29 SDOH — ECONOMIC STABILITY: FOOD INSECURITY: WITHIN THE PAST 12 MONTHS, YOU WORRIED THAT YOUR FOOD WOULD RUN OUT BEFORE YOU GOT MONEY TO BUY MORE.: NEVER TRUE

## 2025-04-29 ASSESSMENT — PATIENT HEALTH QUESTIONNAIRE - PHQ9
3. TROUBLE FALLING OR STAYING ASLEEP: NOT AT ALL
6. FEELING BAD ABOUT YOURSELF - OR THAT YOU ARE A FAILURE OR HAVE LET YOURSELF OR YOUR FAMILY DOWN: NOT AT ALL
9. THOUGHTS THAT YOU WOULD BE BETTER OFF DEAD, OR OF HURTING YOURSELF: NOT AT ALL
5. POOR APPETITE OR OVEREATING: NOT AT ALL
SUM OF ALL RESPONSES TO PHQ QUESTIONS 1-9: 0
7. TROUBLE CONCENTRATING ON THINGS, SUCH AS READING THE NEWSPAPER OR WATCHING TELEVISION: NOT AT ALL
SUM OF ALL RESPONSES TO PHQ QUESTIONS 1-9: 0
2. FEELING DOWN, DEPRESSED OR HOPELESS: NOT AT ALL
4. FEELING TIRED OR HAVING LITTLE ENERGY: NOT AT ALL
8. MOVING OR SPEAKING SO SLOWLY THAT OTHER PEOPLE COULD HAVE NOTICED. OR THE OPPOSITE, BEING SO FIGETY OR RESTLESS THAT YOU HAVE BEEN MOVING AROUND A LOT MORE THAN USUAL: NOT AT ALL
10. IF YOU CHECKED OFF ANY PROBLEMS, HOW DIFFICULT HAVE THESE PROBLEMS MADE IT FOR YOU TO DO YOUR WORK, TAKE CARE OF THINGS AT HOME, OR GET ALONG WITH OTHER PEOPLE: NOT DIFFICULT AT ALL
1. LITTLE INTEREST OR PLEASURE IN DOING THINGS: NOT AT ALL
SUM OF ALL RESPONSES TO PHQ QUESTIONS 1-9: 0
SUM OF ALL RESPONSES TO PHQ QUESTIONS 1-9: 0

## 2025-04-29 NOTE — PROGRESS NOTES
Vadim Hernandez is a 50 y.o. female  established patient, here for evaluation of the following chief complaint(s):  Chief Complaint   Patient presents with    paperwork      Assessment and Plan  1. Prediabetes  2. Anxiety  -     hydrOXYzine HCl (ATARAX) 25 MG tablet; Take 1 tablet by mouth nightly as needed for Anxiety, Disp-90 tablet, R-2Normal  3. Annual physical exam  4. Right hip pain  5. Chronic pain of right knee  6. Screening for diabetes mellitus (DM)  -     Hemoglobin A1C; Future  7. Screening for blood or protein in urine  -     Urinalysis with Microscopic; Future  8. Screening for metabolic disorder  -     Comprehensive Metabolic Panel; Future  9. Screening for cholesterol level  -     Lipid Panel; Future  10. Screening for thyroid disorder  -     TSH; Future  -     T4, Free; Future  11. Screening for deficiency anemia  -     CBC with Auto Differential; Future  12. Screen for STD (sexually transmitted disease)  -     HCV RNA by LUÍS Ql,Rflx TO Qt; Future  -     HIV 1/2 Ag/Ab, 4TH Generation,W Rflx Confirm; Future  13. Elevated LDL cholesterol level  -     pravastatin (PRAVACHOL) 20 MG tablet; Take 1 tablet by mouth daily, Disp-90 tablet, R-3Normal       Return in about 1 month (around 5/29/2025) for results, VV, 15.   HPI:   In office visit.    She has ablation related to endometriosis w/ GYN.    Pt has seen cardiology in the for rapid heart rate. Cardiology ordered her metoprolol 12.5 mg. She doesn't always take it.     Right Hip bursitis . She is seeing ortho. She has right knee pain.     Health Maintenance  Colon cancer: Due May 2033  Dyslipidemia: Lipid panel today  Diabetes mellitus:   Influenza vaccine: due in the fall  Pneumococcal vaccine: N/A  Tdap: May need  Herpes Zoster vaccine: due at age 52  Hep B vaccine: not indicated    Weight: Body mass index is Estimated body mass index is Body mass index is 37.98 kg/m². Discussed the patient's BMI with her.  The BMI follow up plan is as follows: Improve

## 2025-04-30 LAB
A/G RATIO: 1.5 RATIO (ref 1.1–2.6)
ALBUMIN: 4.3 G/DL (ref 3.5–5)
ALP BLD-CCNC: 67 U/L (ref 25–115)
ALT SERPL-CCNC: 15 U/L (ref 5–40)
ANION GAP SERPL CALCULATED.3IONS-SCNC: 10 MMOL/L (ref 3–15)
AST SERPL-CCNC: 17 U/L (ref 10–37)
BACTERIA, URINE: NEGATIVE
BASOPHILS # BLD: 1 % (ref 0–2)
BASOPHILS ABSOLUTE: 0.1 K/UL (ref 0–0.2)
BILIRUB SERPL-MCNC: 0.3 MG/DL (ref 0.2–1.2)
BILIRUBIN, URINE: NEGATIVE
BUN BLDV-MCNC: 14 MG/DL (ref 6–22)
CALCIUM SERPL-MCNC: 9.5 MG/DL (ref 8.4–10.5)
CHLORIDE BLD-SCNC: 105 MMOL/L (ref 98–110)
CHOLESTEROL, TOTAL: 158 MG/DL (ref 110–200)
CHOLESTEROL/HDL RATIO: 2.9 (ref 0–5)
CLARITY, UA: CLEAR
CO2: 25 MMOL/L (ref 20–32)
COLOR, UA: YELLOW
CREAT SERPL-MCNC: 0.6 MG/DL (ref 0.5–1.2)
EOSINOPHIL # BLD: 3 % (ref 0–6)
EOSINOPHILS ABSOLUTE: 0.2 K/UL (ref 0–0.5)
ESTIMATED AVERAGE GLUCOSE: 120 MG/DL (ref 91–123)
GFR, ESTIMATED: >60 ML/MIN/1.73 SQ.M.
GLOBULIN: 2.9 G/DL (ref 2–4)
GLUCOSE URINE: NEGATIVE MG/DL
GLUCOSE: 84 MG/DL (ref 70–99)
HBA1C MFR BLD: 5.8 % (ref 4.8–5.6)
HCT VFR BLD CALC: 42.2 % (ref 35.1–48)
HDLC SERPL-MCNC: 55 MG/DL
HEMOGLOBIN: 13.4 G/DL (ref 11.7–16)
HIV INTERPRETATION: NORMAL
HIV1/0/2 AB/AG: NON REACTIVE
HYALINE CASTS: NORMAL /LPF (ref 0–2)
KETONES, URINE: NEGATIVE MG/DL
LDL CHOLESTEROL: 85 MG/DL (ref 50–99)
LDL/HDL RATIO: 1.6
LEUKOCYTE ESTERASE, URINE: NEGATIVE
LYMPHOCYTES # BLD: 35 % (ref 20–45)
LYMPHOCYTES ABSOLUTE: 2.1 K/UL (ref 1–4.8)
MCH RBC QN AUTO: 30 PG (ref 26–34)
MCHC RBC AUTO-ENTMCNC: 32 G/DL (ref 31–36)
MCV RBC AUTO: 94 FL (ref 80–99)
MONOCYTES ABSOLUTE: 0.5 K/UL (ref 0.1–1)
MONOCYTES: 8 % (ref 3–12)
NEUTROPHILS ABSOLUTE: 3.2 K/UL (ref 1.8–7.7)
NEUTROPHILS SEGMENTED: 54 % (ref 40–75)
NITRITE, URINE: NEGATIVE
NON-HDL CHOLESTEROL: 103 MG/DL
OCCULT BLOOD,URINE: NEGATIVE
PDW BLD-RTO: 12.7 % (ref 10–15.5)
PH, URINE: 5.5 PH (ref 5–8)
PLATELET # BLD: 329 K/UL (ref 140–440)
PMV BLD AUTO: 11.5 FL (ref 9–13)
POTASSIUM SERPL-SCNC: 4.5 MMOL/L (ref 3.5–5.5)
PROTEIN, URINE: NEGATIVE MG/DL
RBC # BLD: 4.5 M/UL (ref 3.8–5.2)
RBC URINE: NORMAL /HPF
SODIUM BLD-SCNC: 140 MMOL/L (ref 133–145)
SPECIFIC GRAVITY UA: 1.02 (ref 1–1.03)
SQUAMOUS EPITHELIAL CELLS: NORMAL /HPF
T4 FREE: 1 NG/DL (ref 0.9–1.8)
TOTAL PROTEIN: 7.2 G/DL (ref 6.4–8.3)
TRIGL SERPL-MCNC: 87 MG/DL (ref 40–149)
TSH SERPL DL<=0.05 MIU/L-ACNC: 1.24 MCU/ML (ref 0.27–4.2)
UROBILINOGEN, URINE: 0.2 MG/DL
VLDLC SERPL CALC-MCNC: 17 MG/DL (ref 8–30)
WBC # BLD: 6 K/UL (ref 4–11)
WBC URINE: NORMAL /HPF (ref 0–5)

## 2025-05-01 LAB — HEPATITIS C VIRUS RNA PCR TND: NORMAL IU/ML

## 2025-05-03 LAB — SENTARA SPECIMEN COLLECTION: NORMAL

## 2025-05-06 DIAGNOSIS — Z12.31 ENCOUNTER FOR SCREENING MAMMOGRAM FOR MALIGNANT NEOPLASM OF BREAST: Primary | ICD-10-CM

## 2025-05-06 RX ORDER — PRAVASTATIN SODIUM 20 MG
20 TABLET ORAL DAILY
Qty: 90 TABLET | Refills: 3 | Status: SHIPPED | OUTPATIENT
Start: 2025-05-06

## 2025-05-07 DIAGNOSIS — E55.9 VITAMIN D DEFICIENCY: Primary | ICD-10-CM

## 2025-06-12 ENCOUNTER — HOSPITAL ENCOUNTER (OUTPATIENT)
Dept: WOMENS IMAGING | Facility: HOSPITAL | Age: 50
Discharge: HOME | End: 2025-06-15
Payer: MEDICAID

## 2025-06-12 DIAGNOSIS — Z12.31 ENCOUNTER FOR SCREENING MAMMOGRAM FOR MALIGNANT NEOPLASM OF BREAST: ICD-10-CM

## 2025-06-12 PROCEDURE — 77063 BREAST TOMOSYNTHESIS BI: CPT
